# Patient Record
Sex: MALE | Race: WHITE | NOT HISPANIC OR LATINO
[De-identification: names, ages, dates, MRNs, and addresses within clinical notes are randomized per-mention and may not be internally consistent; named-entity substitution may affect disease eponyms.]

---

## 2017-10-24 ENCOUNTER — APPOINTMENT (OUTPATIENT)
Dept: UROLOGY | Facility: CLINIC | Age: 57
End: 2017-10-24
Payer: COMMERCIAL

## 2017-10-24 PROCEDURE — 99214 OFFICE O/P EST MOD 30 MIN: CPT

## 2017-10-25 LAB
APPEARANCE: CLEAR
BACTERIA: NEGATIVE
BILIRUBIN URINE: NEGATIVE
BLOOD URINE: NEGATIVE
COLOR: YELLOW
GLUCOSE QUALITATIVE U: 1000 MG/DL
HYALINE CASTS: 2 /LPF
KETONES URINE: NEGATIVE
LEUKOCYTE ESTERASE URINE: NEGATIVE
MICROSCOPIC-UA: NORMAL
NITRITE URINE: NEGATIVE
PH URINE: 5.5
PROTEIN URINE: NEGATIVE MG/DL
PSA FREE FLD-MCNC: 31
PSA FREE SERPL-MCNC: 0.62 NG/ML
PSA SERPL-MCNC: 2 NG/ML
RED BLOOD CELLS URINE: 6 /HPF
SPECIFIC GRAVITY URINE: 1.04
SQUAMOUS EPITHELIAL CELLS: 0 /HPF
UROBILINOGEN URINE: NEGATIVE MG/DL
WHITE BLOOD CELLS URINE: 1 /HPF

## 2017-10-29 LAB — CORE LAB FLUID CYTOLOGY: NORMAL

## 2018-10-23 ENCOUNTER — APPOINTMENT (OUTPATIENT)
Dept: UROLOGY | Facility: CLINIC | Age: 58
End: 2018-10-23
Payer: COMMERCIAL

## 2018-10-23 PROCEDURE — 99214 OFFICE O/P EST MOD 30 MIN: CPT

## 2018-10-24 LAB
APPEARANCE: CLEAR
BACTERIA: NEGATIVE
BILIRUBIN URINE: NEGATIVE
BLOOD URINE: NEGATIVE
COLOR: YELLOW
GLUCOSE QUALITATIVE U: 1000 MG/DL
KETONES URINE: NEGATIVE
LEUKOCYTE ESTERASE URINE: NEGATIVE
MICROSCOPIC-UA: NORMAL
NITRITE URINE: NEGATIVE
PH URINE: 5
PROTEIN URINE: NEGATIVE MG/DL
PSA FREE FLD-MCNC: 35.1
PSA FREE SERPL-MCNC: 0.61 NG/ML
PSA SERPL-MCNC: 1.74 NG/ML
RED BLOOD CELLS URINE: 1 /HPF
SPECIFIC GRAVITY URINE: 1.04
SQUAMOUS EPITHELIAL CELLS: 0 /HPF
UROBILINOGEN URINE: NEGATIVE MG/DL
WHITE BLOOD CELLS URINE: 0 /HPF

## 2019-10-29 ENCOUNTER — APPOINTMENT (OUTPATIENT)
Dept: UROLOGY | Facility: CLINIC | Age: 59
End: 2019-10-29
Payer: COMMERCIAL

## 2019-10-29 PROCEDURE — 99214 OFFICE O/P EST MOD 30 MIN: CPT

## 2019-10-30 LAB
APPEARANCE: CLEAR
BACTERIA: NEGATIVE
BILIRUBIN URINE: NEGATIVE
BLOOD URINE: NEGATIVE
COLOR: NORMAL
GLUCOSE QUALITATIVE U: ABNORMAL
HYALINE CASTS: 1 /LPF
KETONES URINE: NEGATIVE
LEUKOCYTE ESTERASE URINE: NEGATIVE
MICROSCOPIC-UA: NORMAL
NITRITE URINE: NEGATIVE
PH URINE: 5
PROTEIN URINE: NEGATIVE
PSA FREE FLD-MCNC: 30 %
PSA FREE SERPL-MCNC: 0.66 NG/ML
PSA SERPL-MCNC: 2.17 NG/ML
RED BLOOD CELLS URINE: 1 /HPF
SPECIFIC GRAVITY URINE: 1.03
SQUAMOUS EPITHELIAL CELLS: 0 /HPF
UROBILINOGEN URINE: NORMAL
WHITE BLOOD CELLS URINE: 0 /HPF

## 2020-01-31 ENCOUNTER — APPOINTMENT (OUTPATIENT)
Dept: PULMONOLOGY | Facility: CLINIC | Age: 60
End: 2020-01-31
Payer: COMMERCIAL

## 2020-01-31 VITALS
OXYGEN SATURATION: 96 % | RESPIRATION RATE: 17 BRPM | DIASTOLIC BLOOD PRESSURE: 60 MMHG | HEIGHT: 74 IN | WEIGHT: 238 LBS | HEART RATE: 76 BPM | SYSTOLIC BLOOD PRESSURE: 122 MMHG | BODY MASS INDEX: 30.54 KG/M2

## 2020-01-31 DIAGNOSIS — Z78.9 OTHER SPECIFIED HEALTH STATUS: ICD-10-CM

## 2020-01-31 DIAGNOSIS — Z86.69 PERSONAL HISTORY OF OTHER DISEASES OF THE NERVOUS SYSTEM AND SENSE ORGANS: ICD-10-CM

## 2020-01-31 DIAGNOSIS — Z86.79 PERSONAL HISTORY OF OTHER DISEASES OF THE CIRCULATORY SYSTEM: ICD-10-CM

## 2020-01-31 DIAGNOSIS — Z86.59 PERSONAL HISTORY OF OTHER MENTAL AND BEHAVIORAL DISORDERS: ICD-10-CM

## 2020-01-31 DIAGNOSIS — Z82.0 FAMILY HISTORY OF EPILEPSY AND OTHER DISEASES OF THE NERVOUS SYSTEM: ICD-10-CM

## 2020-01-31 DIAGNOSIS — Z86.39 PERSONAL HISTORY OF OTHER ENDOCRINE, NUTRITIONAL AND METABOLIC DISEASE: ICD-10-CM

## 2020-01-31 DIAGNOSIS — Z82.49 FAMILY HISTORY OF ISCHEMIC HEART DISEASE AND OTHER DISEASES OF THE CIRCULATORY SYSTEM: ICD-10-CM

## 2020-01-31 DIAGNOSIS — Z83.79 FAMILY HISTORY OF OTHER DISEASES OF THE DIGESTIVE SYSTEM: ICD-10-CM

## 2020-01-31 DIAGNOSIS — J45.909 UNSPECIFIED ASTHMA, UNCOMPLICATED: ICD-10-CM

## 2020-01-31 DIAGNOSIS — Z80.8 FAMILY HISTORY OF MALIGNANT NEOPLASM OF OTHER ORGANS OR SYSTEMS: ICD-10-CM

## 2020-01-31 DIAGNOSIS — Z82.5 FAMILY HISTORY OF ASTHMA AND OTHER CHRONIC LOWER RESPIRATORY DISEASES: ICD-10-CM

## 2020-01-31 PROCEDURE — 94060 EVALUATION OF WHEEZING: CPT

## 2020-01-31 PROCEDURE — 94727 GAS DIL/WSHOT DETER LNG VOL: CPT

## 2020-01-31 PROCEDURE — 94618 PULMONARY STRESS TESTING: CPT

## 2020-01-31 PROCEDURE — 99204 OFFICE O/P NEW MOD 45 MIN: CPT | Mod: 25

## 2020-01-31 PROCEDURE — 94729 DIFFUSING CAPACITY: CPT

## 2020-01-31 PROCEDURE — 71046 X-RAY EXAM CHEST 2 VIEWS: CPT

## 2020-01-31 PROCEDURE — ZZZZZ: CPT

## 2020-01-31 RX ORDER — ROSUVASTATIN CALCIUM 5 MG/1
TABLET, FILM COATED ORAL
Refills: 0 | Status: ACTIVE | COMMUNITY

## 2020-01-31 NOTE — HISTORY OF PRESENT ILLNESS
[TextBox_4] : Mr. DÍAZ is a 59 year old male presenting to the office today for initial pulmonary evaluation. His chief complaint is recent illnesses\par -he notes he had been feeling well until getting sick and getting better 3 times\par -his first illness began about 1 month ago\par -he reports he had brought his father in law to the hospital and had been exposed to sick people (flu) for about 5.5 hours\par -he has intermittent headaches (sinus related) and hadnt had them before his sicknesses \par -he reports having SOB upon exertion, but not usually, and denies SOB on his back\par -he reports having a post nasal drip during his illness, but very occasionally due to seasonal. He had  had seasonal allergies and asthma during his childhood\par -he notes his weight is stable\par -he could fall asleep in a car \par -he notes his neck size is 18\par -he reports he wakes up with nocturia 1-2x nightly \par -he reports he moves around and is restless at night. He gets up\par -he notes he sleeps in 1.5-2 hour periods \par -he reports having reflux, and had dysphagia until age 29, and food collects in his throat\par -he denies any lymphadenopathy visual issues, wheezing, chest pain, chest pressure, diarrhea, constipation, dysphagia, dizziness, leg swelling, leg pain, itchy eyes, itchy ears, heartburn, reflux, sour taste in the mouth, myalgias or arthralgias.

## 2020-01-31 NOTE — REASON FOR VISIT
[Initial] : an initial visit [Abnormal CXR/ Chest CT] : an abnormal CXR/ chest CT [TextBox_44] : SOB

## 2020-01-31 NOTE — REVIEW OF SYSTEMS
[Negative] : Neurologic [Nasal Congestion] : nasal congestion [Frequent URIs] : frequent URIs [Sinus Problems] : sinus problems [Postnasal Drip] : postnasal drip [SOB on Exertion] : sob on exertion [GERD] : gerd [Dysphagia] : dysphagia [Dyspnea] : no dyspnea [Wheezing] : no wheezing [Chest Discomfort] : no chest discomfort [Diarrhea] : no diarrhea [Constipation] : no constipation

## 2020-01-31 NOTE — ASSESSMENT
[FreeTextEntry1] : Mr. DÍAZ is a 59 year old male with a history of childhood asthma, allergies, nonsmoker, BPH, depression, HLD, HTN, DM, who comes into the office today for pulmonary evaluation for abnormal CXR (elevated hemidiaphragm), frequent infections, and SOB.\par \par The patient's shortness of breath is multifactorial due to:\par -pulmonary disease \par      -childhood asthma\par      -?diaphragm paralysis\par -poor breathing mechanics \par -overweight/out of shape\par -?cardiac disease \par \par \par Problem 1: Childhood asthma\par -no treatment for asthma at the current time\par \par -Asthma is believed to be caused by inherited (genetic) and environmental factor, but its exact cause is unknown. Asthma may be triggered by allergens, lung infections, or irritants in the air. Asthma triggers are different for each person \par \par Problem 2: ?paralyzed left hemidiaphragm\par -Complete ultrasound of the diaphragm\par \par Problem 3: Allergy / Sinus / Post Nasal Drip\par -Add Olopatadine 0.6% at 1 sniff/nostril BID \par \par Environmental measures for allergies were encouraged including mattress and pillow cover, air purifier, and environmental controls. \par \par Problem 4: GERD\par -Protonix 40 mg before breakfast\par \par -Rule of 2s: avoid eating too much, eating too fast, eating too late, eating too spicy, eating too lousy, eating two hours before bed.\par -Things to avoid including overeating, spicy foods, tight clothing, eating within three hours of bed, this list is not all inclusive. \par -For treatment of reflux, possible options discussed including diet control, H2 blockers, PPIs, as well as coating motility agents discussed as treatment options. Timing of meals and proximity of last meal to sleep were discussed. If symptoms persist, a formal gastrointestinal evaluation is needed.\par \par Problem 5: Frequent Infections / R/o immune deficiency\par -Complete blood work: strep pneumonia titers, IgG levels, quantitative immunoglobulins \par -Due to the fact that this pt has had more infections than would be expected and immunological blood work is indicated this would include: IgG subclasses, quantitative immunoglobulins, Strep pneumoniae titers as well as Vitamin D levels. Based on this blood work we will be able to decide where the pt needs additional pneumococcal vaccine either Prevnar 13 or pneumovax. Immunology evaluation will also be potentially indicated.\par \par Problem 6A: Poor Sleep / ?CHUCK / ?nocturnal hypoxemia\par -Studies to complete: thyroid function test, free and total testosterone level \par -Recommended to complete a home sleep study due to his inability to maintain sleep, nocturia, large neck size, elevated MP class, elevated red blood cells, nonrestorative sleep, EDS\par \par Sleep apnea is associated with adverse clinical consequences which an affect most organ systems. Cardiovascular disease risk includes arrhythmias, atrial fibrillation, hypertension, coronary artery disease, and stroke. Metabolic disorders include diabetes type 2, non-alcoholic fatty liver disease. Mood disorder especially depression; and cognitive decline especially in the elderly. Associations with chronic reflux/Spencer’s esophagus some but not all inclusive. \par -Reasons include arousal consistent with hypopnea; respiratory events most prominent in REM sleep or supine position; therefore sleep staging and body position are important for accurate diagnosis and estimation of AHI. \par \par Problem 6B: RLS\par -Add Mirapex .5 mg QHS\par -Studies to complete: iron studies\par \par Restless Legs Syndrome (RLS), also known as Mckeon-Ekbom Disease, is a common sleep -related movement disorder. About 1 in 10 adults in the [place] have problems from restless leg syndrome. It also can be seen in about 2% of children. Women are twice as likely as men to have RLS. People with RLS will have symptoms most often during times when they are less active, especially at bedtime. RLS most often causes an overwhelming urge to move your legs and sometimes other parts of your body. This urge is associated with unpleasant sensations in different parts of the body. The symptoms can be mild to severe and can affect your ability to go to sleep and stay asleep. People with RLS often sleep less at night and feel more tired during the day. \par \par Problem 7: Poor Mechanics of Breathing\par - Proper breathing techniques were reviewed with an emphasis of exhalation. Patient instructed to breath in for 1 second and out for four seconds. Patient was encouraged to not talk while walking. \par \par Problem 8: Overweight\par -Weight loss, exercise, and diet control were discussed and are highly encouraged. Treatment options were given such as, aqua therapy, and contacting a nutritionist. Recommended to use the elliptical, stationary bike, less use of treadmill. Mindful eating was explained to the patient Obesity is associated with worsening asthma, shortness of breath, and potential for cardiac disease, diabetes, and other underlying medical conditions. \par \par Problem 9: Cardiac\par -recommended to follow up with a cardiologist (Sridhar Morales)\par \par Problem 10: health maintenance\par -s/p influenza vaccine\par -recommended strep pneumonia vaccines after age 65: Prevnar-13 vaccine, followed by Pneumo vaccine 23 one year following\par -recommended early intervention for URIs\par -recommended regular osteoporosis evaluations\par -recommended early dermatological evaluations\par -recommended after the age of 50 to the age of 70, colonoscopy every 5 years \par \par  Follow up in 6-8 weeks\par -he  is recommended to call with any changes, questions, or concerns.

## 2020-01-31 NOTE — ADDENDUM
[FreeTextEntry1] : Documented by Maxim Zamora acting as a scribe for Dr. Brandan Oliveros on 01/31/2020.\par \par All medical record entries made by the Scribe were at my, Dr. Brandan Oliveros's, direction and personally dictated by me on 01/31/2020. I have reviewed the chart and agree that the record accurately reflects my personal performance of the history, physical exam, assessment and plan. I have also personally directed, reviewed, and agree with the discharge instructions.  40

## 2020-01-31 NOTE — PROCEDURE
[FreeTextEntry1] : CXR reveals a normal sized heart; no evidence of infiltrate or effusion, elevated left hemidiaphragm with consequent volume loss\par \par CXR (1/29/2020) reveals elevated left hemidiaphragm. No evidence of active pulmonary disease.\par \par 6 minute walk test reveals a low saturation of 93% with no evidence of dyspnea or fatigue; walked 489 meters\par \par Full PFT revealed normal flows, with a FEV1 of 3.41L, which is 80% of predicted, normal lung volumes, and a diffusion of 29.4, which is 102% of predicted, with a normal flow volume loop \par \par  \par \par

## 2020-01-31 NOTE — PHYSICAL EXAM
[No Acute Distress] : no acute distress [Normal Appearance] : normal appearance [No Neck Mass] : no neck mass [Normal Oropharynx] : normal oropharynx [Normal S1, S2] : normal s1, s2 [Normal Rate/Rhythm] : normal rate/rhythm [No Murmurs] : no murmurs [No Resp Distress] : no resp distress [Clear to Auscultation Bilaterally] : clear to auscultation bilaterally [No Abnormalities] : no abnormalities [Benign] : benign [Normal Gait] : normal gait [No Clubbing] : no clubbing [No Cyanosis] : no cyanosis [FROM] : FROM [Normal Color/ Pigmentation] : normal color/ pigmentation [No Edema] : no edema [No Focal Deficits] : no focal deficits [Oriented x3] : oriented x3 [Normal Affect] : normal affect [II] : Mallampati Class: II [TextBox_68] : I:E ratio 1:3; clear

## 2020-03-06 ENCOUNTER — LABORATORY RESULT (OUTPATIENT)
Age: 60
End: 2020-03-06

## 2020-03-08 LAB
24R-OH-CALCIDIOL SERPL-MCNC: 23.5 PG/ML
25(OH)D3 SERPL-MCNC: 25 NG/ML
BASOPHILS # BLD AUTO: 0.06 K/UL
BASOPHILS NFR BLD AUTO: 0.6 %
DEPRECATED KAPPA LC FREE/LAMBDA SER: 1.34 RATIO
EOSINOPHIL # BLD AUTO: 0.49 K/UL
EOSINOPHIL NFR BLD AUTO: 5 %
FERRITIN SERPL-MCNC: 153 NG/ML
HCT VFR BLD CALC: 55.1 %
HGB BLD-MCNC: 17.4 G/DL
IGA SER QL IEP: 264 MG/DL
IGG SER QL IEP: 896 MG/DL
IGM SER QL IEP: 42 MG/DL
IMM GRANULOCYTES NFR BLD AUTO: 0.3 %
IRON SATN MFR SERPL: 22 %
IRON SERPL-MCNC: 71 UG/DL
KAPPA LC CSF-MCNC: 1.24 MG/DL
KAPPA LC SERPL-MCNC: 1.66 MG/DL
LYMPHOCYTES # BLD AUTO: 2.5 K/UL
LYMPHOCYTES NFR BLD AUTO: 25.4 %
MAN DIFF?: NORMAL
MCHC RBC-ENTMCNC: 30.1 PG
MCHC RBC-ENTMCNC: 31.6 GM/DL
MCV RBC AUTO: 95.2 FL
MONOCYTES # BLD AUTO: 0.96 K/UL
MONOCYTES NFR BLD AUTO: 9.7 %
NEUTROPHILS # BLD AUTO: 5.82 K/UL
NEUTROPHILS NFR BLD AUTO: 59 %
PLATELET # BLD AUTO: 190 K/UL
RBC # BLD: 5.79 M/UL
RBC # FLD: 13.3 %
TIBC SERPL-MCNC: 317 UG/DL
UIBC SERPL-MCNC: 246 UG/DL
WBC # FLD AUTO: 9.86 K/UL

## 2020-03-09 LAB
IGG SUBSET TOTAL IGG: 986 MG/DL
IGG1 SER-MCNC: 541 MG/DL
IGG2 SER-MCNC: 316 MG/DL
IGG3 SER-MCNC: 49 MG/DL
IGG4 SER-MCNC: 30 MG/DL

## 2020-03-10 LAB
A ALTERNATA IGE QN: 0.77 KUA/L
A FUMIGATUS IGE QN: <0.1 KUA/L
C ALBICANS IGE QN: 0.12 KUA/L
C HERBARUM IGE QN: <0.1 KUA/L
CAT DANDER IGE QN: <0.1 KUA/L
COMMON RAGWEED IGE QN: <0.1 KUA/L
D FARINAE IGE QN: <0.1 KUA/L
D PTERONYSS IGE QN: <0.1 KUA/L
DEPRECATED A ALTERNATA IGE RAST QL: 2
DEPRECATED A FUMIGATUS IGE RAST QL: 0
DEPRECATED C ALBICANS IGE RAST QL: NORMAL
DEPRECATED C HERBARUM IGE RAST QL: 0
DEPRECATED CAT DANDER IGE RAST QL: 0
DEPRECATED COMMON RAGWEED IGE RAST QL: 0
DEPRECATED D FARINAE IGE RAST QL: 0
DEPRECATED D PTERONYSS IGE RAST QL: 0
DEPRECATED DOG DANDER IGE RAST QL: 0
DEPRECATED M RACEMOSUS IGE RAST QL: 0
DEPRECATED ROACH IGE RAST QL: 0
DEPRECATED TIMOTHY IGE RAST QL: NORMAL
DEPRECATED WHITE OAK IGE RAST QL: 0
DOG DANDER IGE QN: <0.1 KUA/L
M RACEMOSUS IGE QN: <0.1 KUA/L
ROACH IGE QN: <0.1 KUA/L
TIMOTHY IGE QN: 0.14 KUA/L
TOTAL IGE SMQN RAST: 73 KU/L
WHITE OAK IGE QN: <0.1 KUA/L

## 2020-03-12 LAB
CLAM IGE QN: <0.1 KUA/L
CODFISH IGE QN: <0.1 KUA/L
CORN IGE QN: 0.22 KUA/L
COW MILK IGE QN: <0.1 KUA/L
DEPRECATED CLAM IGE RAST QL: 0
DEPRECATED CODFISH IGE RAST QL: 0
DEPRECATED CORN IGE RAST QL: NORMAL
DEPRECATED COW MILK IGE RAST QL: 0
DEPRECATED EGG WHITE IGE RAST QL: 0
DEPRECATED PEANUT IGE RAST QL: 0
DEPRECATED SCALLOP IGE RAST QL: <0.1 KUA/L
DEPRECATED SESAME SEED IGE RAST QL: 3
DEPRECATED SHRIMP IGE RAST QL: 0
DEPRECATED SOYBEAN IGE RAST QL: NORMAL
DEPRECATED WALNUT IGE RAST QL: 3
DEPRECATED WHEAT IGE RAST QL: NORMAL
EGG WHITE IGE QN: <0.1 KUA/L
PEANUT IGE QN: <0.1 KUA/L
SCALLOP IGE QN: 0
SCALLOP IGE QN: <0.1 KUA/L
SESAME SEED IGE QN: 8.74 KUA/L
SOYBEAN IGE QN: 0.15 KUA/L
TESTOST BND SERPL-MCNC: 9.6 PG/ML
TESTOST SERPL-MCNC: 367.3 NG/DL
WALNUT IGE QN: 8.32 KUA/L
WHEAT IGE QN: 0.32 KUA/L

## 2020-03-13 LAB
DEPRECATED S PNEUM 1 IGG SER-MCNC: 16 MCG/ML
DEPRECATED S PNEUM12 AB SER-ACNC: 5.9 MCG/ML
DEPRECATED S PNEUM14 AB SER-ACNC: 11 MCG/ML
DEPRECATED S PNEUM17 IGG SER IA-MCNC: 26 MCG/ML
DEPRECATED S PNEUM18 IGG SER IA-MCNC: 1.8 MCG/ML
DEPRECATED S PNEUM19 IGG SER-MCNC: 16.2 MCG/ML
DEPRECATED S PNEUM19 IGG SER-MCNC: 17.7 MCG/ML
DEPRECATED S PNEUM2 IGG SER-MCNC: 14 MCG/ML
DEPRECATED S PNEUM20 IGG SER-MCNC: 20.5 MCG/ML
DEPRECATED S PNEUM22 IGG SER-MCNC: 34.5 MCG/ML
DEPRECATED S PNEUM23 AB SER-ACNC: 88.5 MCG/ML
DEPRECATED S PNEUM3 AB SER-ACNC: 27.3 MCG/ML
DEPRECATED S PNEUM34 IGG SER-MCNC: 47.1 MCG/ML
DEPRECATED S PNEUM4 AB SER-ACNC: 5.8 MCG/ML
DEPRECATED S PNEUM5 IGG SER-MCNC: 33.7 MCG/ML
DEPRECATED S PNEUM6 IGG SER-MCNC: 24.9 MCG/ML
DEPRECATED S PNEUM7 IGG SER-ACNC: 43.7 MCG/ML
DEPRECATED S PNEUM8 AB SER-ACNC: 30.5 MCG/ML
DEPRECATED S PNEUM9 AB SER-ACNC: 38.6 MCG/ML
DEPRECATED S PNEUM9 IGG SER-MCNC: 10.1 MCG/ML
STREPTOCOCCUS PNEUMONIAE SEROTYPE 11A: 7.4 MCG/ML
STREPTOCOCCUS PNEUMONIAE SEROTYPE 15B: 11.2 MCG/ML
STREPTOCOCCUS PNEUMONIAE SEROTYPE 33F: 5.8 MCG/ML

## 2020-03-17 DIAGNOSIS — D58.2 OTHER HEMOGLOBINOPATHIES: ICD-10-CM

## 2020-05-08 ENCOUNTER — RX RENEWAL (OUTPATIENT)
Age: 60
End: 2020-05-08

## 2020-11-03 ENCOUNTER — APPOINTMENT (OUTPATIENT)
Dept: UROLOGY | Facility: CLINIC | Age: 60
End: 2020-11-03
Payer: COMMERCIAL

## 2020-11-03 VITALS — TEMPERATURE: 97.1 F

## 2020-11-03 DIAGNOSIS — N52.9 MALE ERECTILE DYSFUNCTION, UNSPECIFIED: ICD-10-CM

## 2020-11-03 PROCEDURE — 99072 ADDL SUPL MATRL&STAF TM PHE: CPT

## 2020-11-03 PROCEDURE — 99214 OFFICE O/P EST MOD 30 MIN: CPT

## 2020-11-04 LAB
APPEARANCE: CLEAR
BACTERIA: NEGATIVE
BILIRUBIN URINE: NEGATIVE
BLOOD URINE: NEGATIVE
COLOR: NORMAL
GLUCOSE QUALITATIVE U: ABNORMAL
HYALINE CASTS: 0 /LPF
KETONES URINE: NEGATIVE
LEUKOCYTE ESTERASE URINE: NEGATIVE
MICROSCOPIC-UA: NORMAL
NITRITE URINE: NEGATIVE
PH URINE: 6
PROTEIN URINE: NORMAL
PSA FREE FLD-MCNC: 29 %
PSA FREE SERPL-MCNC: 0.62 NG/ML
PSA SERPL-MCNC: 2.18 NG/ML
RED BLOOD CELLS URINE: 1 /HPF
SPECIFIC GRAVITY URINE: 1.04
SQUAMOUS EPITHELIAL CELLS: 0 /HPF
UROBILINOGEN URINE: NORMAL
WHITE BLOOD CELLS URINE: 0 /HPF

## 2020-11-10 ENCOUNTER — TRANSCRIPTION ENCOUNTER (OUTPATIENT)
Age: 60
End: 2020-11-10

## 2020-11-17 ENCOUNTER — APPOINTMENT (OUTPATIENT)
Dept: PULMONOLOGY | Facility: CLINIC | Age: 60
End: 2020-11-17
Payer: COMMERCIAL

## 2020-11-17 PROCEDURE — 99214 OFFICE O/P EST MOD 30 MIN: CPT | Mod: 95

## 2020-11-17 RX ORDER — FAMOTIDINE 40 MG/1
40 TABLET, FILM COATED ORAL
Qty: 90 | Refills: 1 | Status: ACTIVE | COMMUNITY
Start: 2020-11-17 | End: 1900-01-01

## 2020-11-17 NOTE — ASSESSMENT
[FreeTextEntry1] : Mr. DAÍZ is a 60 year old male with a history of childhood asthma, allergies, nonsmoker, BPH, depression, HLD, HTN, DM, who presents to the office via video call today for pulmonary evaluation for abnormal CXR (elevated hemidiaphragm), frequent infections, and SOB - now Covid-19 infection with residual bronchospasm\par \par The patient's shortness of breath is multifactorial due to:\par -pulmonary disease \par      -Covid-19 / Bronchospasm\par      -childhood asthma\par      -?diaphragm paralysis\par -poor breathing mechanics \par -overweight/out of shape\par -?cardiac disease \par \par \par Problem 1: Childhood asthma (active)\par -Add Symbicort (160) 2 inhalations BID \par \par -Asthma is believed to be caused by inherited (genetic) and environmental factor, but its exact cause is unknown. Asthma may be triggered by allergens, lung infections, or irritants in the air. Asthma triggers are different for each person \par \par Problem 1A: Covid-19 infection\par -Add a course of Prednisone; 30 mg for 5 days, then 20 mg for 5 days, then 10 mg for 5 days\par Information sheet given to the patient to be reviewed, this medication is never to be used without consulting the prescribing physician. Proper dietary restraint is necessary specifically salt containing foods, if any reaction may occur should be reported. \par \par -s/p Zithromax / Plaquenil (other provider)\par \par -Due to the short supply of COVID19 testing out right now- advised pt that under the advisement of the ID department at [facility]- the recommendation is that all patients with symptoms of suspected coronavirus no longer seek testing and treat symptoms at home while self-quarantined, as long as they are stable.\par Recommendations to patients with possible or confirmed COVID19:\par 1.   Stay home and away from public places. If you must go out, avoid using any kind of public transportation, ridesharing, or taxis.\par 2.   Monitor your symptoms carefully. If your symptoms get worse, call your healthcare provider immediately.\par 3.   Get rest and stay hydrated.\par 4.   Monitor temperature- treat with Tylenol 650 mg Q 6 hours up to 1000 mg TID-QID but not exceed 3500 mg daily for liver precautions. Avoid use of NSAIDs.\par 5.   Be sure to continue to take your maintenance medications for chronic conditions.\par As much as possible, stay in a specific room and away from other people in your home. Also, you should use a separate bathroom, if available. If you need to be around other people in or outside of the home, wear a facemask.\par If you develop emergency warning signs for serious complications for COVID-19 get medical attention immediately. Emergency warning signs include*:\par -Trouble breathing/worsening- unresolved SOB\par -Persistent pain or pressure in the chest\par -New confusion or inability to arouse\par -Bluish lips or face\par  -Worsening fatigue/malaise\par -Inability to drink/stay hydrated\par -High fever unresponsive to Tylenol\par Advised to keep us posted.\par Immune Support Recommendations:\par -OTC Vitamin C 500mg BID \par -OTC Quercetin 250-500mg BID \par -OTC Zinc 75-100mg per day \par -OTC Melatonin 1or 2mg a night \par -OTC Vitamin D 1-4000mg per day \par -OTC Tonic Water 8oz per day \par \par Problem 2: ?paralyzed left hemidiaphragm\par -Complete ultrasound of the diaphragm\par \par Problem 3: Allergy / Sinus / Post Nasal Drip (active)\par -continue Olopatadine 0.6% at 1 sniff/nostril BID \par -Add Allegra D 180 QAM\par -Add Xyzal 5 mg qHS \par \par Environmental measures for allergies were encouraged including mattress and pillow cover, air purifier, and environmental controls. \par \par Problem 4: GERD\par -Protonix 40 mg before breakfast\par \par -Rule of 2s: avoid eating too much, eating too fast, eating too late, eating too spicy, eating too lousy, eating two hours before bed.\par -Things to avoid including overeating, spicy foods, tight clothing, eating within three hours of bed, this list is not all inclusive. \par -For treatment of reflux, possible options discussed including diet control, H2 blockers, PPIs, as well as coating motility agents discussed as treatment options. Timing of meals and proximity of last meal to sleep were discussed. If symptoms persist, a formal gastrointestinal evaluation is needed.\par \par Problem 5: Frequent Infections / R/o immune deficiency\par -Complete blood work: strep pneumonia titers, IgG levels, quantitative immunoglobulins \par -Due to the fact that this pt has had more infections than would be expected and immunological blood work is indicated this would include: IgG subclasses, quantitative immunoglobulins, Strep pneumoniae titers as well as Vitamin D levels. Based on this blood work we will be able to decide where the pt needs additional pneumococcal vaccine either Prevnar 13 or pneumovax. Immunology evaluation will also be potentially indicated.\par \par Problem 6A: Poor Sleep / ?CHUCK / ?nocturnal hypoxemia\par -Studies to complete: thyroid function test, free and total testosterone level \par -Recommended to complete a home sleep study due to his inability to maintain sleep, nocturia, large neck size, elevated MP class, elevated red blood cells, nonrestorative sleep, EDS\par \par Sleep apnea is associated with adverse clinical consequences which an affect most organ systems. Cardiovascular disease risk includes arrhythmias, atrial fibrillation, hypertension, coronary artery disease, and stroke. Metabolic disorders include diabetes type 2, non-alcoholic fatty liver disease. Mood disorder especially depression; and cognitive decline especially in the elderly. Associations with chronic reflux/Spencer’s esophagus some but not all inclusive. \par -Reasons include arousal consistent with hypopnea; respiratory events most prominent in REM sleep or supine position; therefore sleep staging and body position are important for accurate diagnosis and estimation of AHI. \par \par Problem 6B: RLS\par -Add Mirapex .5 mg QHS\par -Studies to complete: iron studies\par \par Restless Legs Syndrome (RLS), also known as Mckeon-Ekbom Disease, is a common sleep -related movement disorder. About 1 in 10 adults in the [place] have problems from restless leg syndrome. It also can be seen in about 2% of children. Women are twice as likely as men to have RLS. People with RLS will have symptoms most often during times when they are less active, especially at bedtime. RLS most often causes an overwhelming urge to move your legs and sometimes other parts of your body. This urge is associated with unpleasant sensations in different parts of the body. The symptoms can be mild to severe and can affect your ability to go to sleep and stay asleep. People with RLS often sleep less at night and feel more tired during the day. \par \par Problem 7: Poor Mechanics of Breathing\par - Proper breathing techniques were reviewed with an emphasis of exhalation. Patient instructed to breath in for 1 second and out for four seconds. Patient was encouraged to not talk while walking. \par \par Problem 8: Overweight\par -Weight loss, exercise, and diet control were discussed and are highly encouraged. Treatment options were given such as, aqua therapy, and contacting a nutritionist. Recommended to use the elliptical, stationary bike, less use of treadmill. Mindful eating was explained to the patient Obesity is associated with worsening asthma, shortness of breath, and potential for cardiac disease, diabetes, and other underlying medical conditions. \par \par Problem 9: Cardiac\par -recommended to follow up with a cardiologist (Sridhar Morales)\par \par Problem 10: health maintenance\par -s/p influenza vaccine\par -recommended strep pneumonia vaccines after age 65: Prevnar-13 vaccine, followed by Pneumo vaccine 23 one year following\par -recommended early intervention for URIs\par -recommended regular osteoporosis evaluations\par -recommended early dermatological evaluations\par -recommended after the age of 50 to the age of 70, colonoscopy every 5 years \par \par  Follow up in 6-8 weeks\par -he  is recommended to call with any changes, questions, or concerns.

## 2020-11-17 NOTE — ADDENDUM
[FreeTextEntry1] : Documented by Maxim Zamora acting as a scribe for Dr. Brandan Oliveros on 11/17/2020.\par \par All medical record entries made by the Scribe were at my, Dr. Brandan Oliveros's, direction and personally dictated by me on 11/17/2020. I have reviewed the chart and agree that the record accurately reflects my personal performance of the history, physical exam, assessment and plan. I have also personally directed, reviewed, and agree with the discharge instructions.

## 2020-11-17 NOTE — REASON FOR VISIT
[Follow-Up] : a follow-up visit [Abnormal CXR/ Chest CT] : an abnormal CXR/ chest CT [TextBox_44] : video call - SOB, Covid-19 infection / bronchospasm

## 2020-11-17 NOTE — HISTORY OF PRESENT ILLNESS
[Home] : at home, [unfilled] , at the time of the visit. [Medical Office: ([facility])___] : at the medical office located in  [Verbal consent obtained from patient] : the patient, [unfilled] [TextBox_4] : Mr. DÍAZ is a 59 year old male with a history of childhood asthma, elevated hemidiaphragm, frequent bronchitis, GERD, overweight, post nasal drip, RLS, snoring, and SOB presenting to the office via video call today for a follow up pulmonary evaluation. His chief complaint is Covid-19 infection\par -his wife states he is positive for Covid, and she tested negative\par -he has been taking Liposomal Glutathione, zince, vitamin D and other supplements\par -he tested positive about 1 week ago, and believes he got sick about 1.5 weeks ago. -His wife states he is not feeling well. He had difficulty one night, and called  a doctor friend, who gave him zithromax 500 mg, Medrol dose pack, hydoxychloroquine. His wellness waxes and wanes\par -his current symptoms include chills, sweating, just started wheezing (belives it is in his upper right lung), throat clearing\par -he states he generally sleeps well, and takes Klonopin\par -he denies any chest pain, chest pressure, diarrhea, constipation, dysphagia, dizziness, sour taste in the mouth, leg swelling, leg pain, itchy eyes, itchy ears, heartburn, reflux, myalgias or arthralgias.

## 2020-11-19 ENCOUNTER — INPATIENT (INPATIENT)
Facility: HOSPITAL | Age: 60
LOS: 4 days | Discharge: HOME | DRG: 177 | End: 2020-11-24
Attending: INTERNAL MEDICINE | Admitting: STUDENT IN AN ORGANIZED HEALTH CARE EDUCATION/TRAINING PROGRAM
Payer: COMMERCIAL

## 2020-11-19 ENCOUNTER — NON-APPOINTMENT (OUTPATIENT)
Age: 60
End: 2020-11-19

## 2020-11-19 VITALS
TEMPERATURE: 100 F | OXYGEN SATURATION: 93 % | SYSTOLIC BLOOD PRESSURE: 122 MMHG | DIASTOLIC BLOOD PRESSURE: 79 MMHG | RESPIRATION RATE: 19 BRPM | HEART RATE: 92 BPM | WEIGHT: 235.01 LBS | HEIGHT: 74 IN

## 2020-11-19 DIAGNOSIS — E11.9 TYPE 2 DIABETES MELLITUS WITHOUT COMPLICATIONS: ICD-10-CM

## 2020-11-19 DIAGNOSIS — E78.5 HYPERLIPIDEMIA, UNSPECIFIED: ICD-10-CM

## 2020-11-19 DIAGNOSIS — U07.1 COVID-19: ICD-10-CM

## 2020-11-19 DIAGNOSIS — Z29.9 ENCOUNTER FOR PROPHYLACTIC MEASURES, UNSPECIFIED: ICD-10-CM

## 2020-11-19 LAB
ALBUMIN SERPL ELPH-MCNC: 3.7 G/DL — SIGNIFICANT CHANGE UP (ref 3.3–5)
ALP SERPL-CCNC: 51 U/L — SIGNIFICANT CHANGE UP (ref 40–120)
ALT FLD-CCNC: 32 U/L — SIGNIFICANT CHANGE UP (ref 10–45)
ANION GAP SERPL CALC-SCNC: 17 MMOL/L — SIGNIFICANT CHANGE UP (ref 5–17)
AST SERPL-CCNC: 18 U/L — SIGNIFICANT CHANGE UP (ref 10–40)
BASOPHILS # BLD AUTO: 0.02 K/UL — SIGNIFICANT CHANGE UP (ref 0–0.2)
BASOPHILS NFR BLD AUTO: 0.2 % — SIGNIFICANT CHANGE UP (ref 0–2)
BILIRUB SERPL-MCNC: 0.4 MG/DL — SIGNIFICANT CHANGE UP (ref 0.2–1.2)
BUN SERPL-MCNC: 21 MG/DL — SIGNIFICANT CHANGE UP (ref 7–23)
CALCIUM SERPL-MCNC: 9 MG/DL — SIGNIFICANT CHANGE UP (ref 8.4–10.5)
CHLORIDE SERPL-SCNC: 99 MMOL/L — SIGNIFICANT CHANGE UP (ref 96–108)
CO2 SERPL-SCNC: 23 MMOL/L — SIGNIFICANT CHANGE UP (ref 22–31)
CREAT SERPL-MCNC: 1.12 MG/DL — SIGNIFICANT CHANGE UP (ref 0.5–1.3)
CRP SERPL-MCNC: 3.95 MG/DL — HIGH (ref 0–0.4)
D DIMER BLD IA.RAPID-MCNC: 210 NG/ML DDU — SIGNIFICANT CHANGE UP
EOSINOPHIL # BLD AUTO: 0.01 K/UL — SIGNIFICANT CHANGE UP (ref 0–0.5)
EOSINOPHIL NFR BLD AUTO: 0.1 % — SIGNIFICANT CHANGE UP (ref 0–6)
GLUCOSE BLDC GLUCOMTR-MCNC: 142 MG/DL — HIGH (ref 70–99)
GLUCOSE BLDC GLUCOMTR-MCNC: 235 MG/DL — HIGH (ref 70–99)
GLUCOSE SERPL-MCNC: 142 MG/DL — HIGH (ref 70–99)
HCT VFR BLD CALC: 46 % — SIGNIFICANT CHANGE UP (ref 39–50)
HGB BLD-MCNC: 15.3 G/DL — SIGNIFICANT CHANGE UP (ref 13–17)
IMM GRANULOCYTES NFR BLD AUTO: 1 % — SIGNIFICANT CHANGE UP (ref 0–1.5)
LYMPHOCYTES # BLD AUTO: 0.95 K/UL — LOW (ref 1–3.3)
LYMPHOCYTES # BLD AUTO: 7.2 % — LOW (ref 13–44)
MCHC RBC-ENTMCNC: 30.4 PG — SIGNIFICANT CHANGE UP (ref 27–34)
MCHC RBC-ENTMCNC: 33.3 GM/DL — SIGNIFICANT CHANGE UP (ref 32–36)
MCV RBC AUTO: 91.3 FL — SIGNIFICANT CHANGE UP (ref 80–100)
MONOCYTES # BLD AUTO: 1.11 K/UL — HIGH (ref 0–0.9)
MONOCYTES NFR BLD AUTO: 8.4 % — SIGNIFICANT CHANGE UP (ref 2–14)
NEUTROPHILS # BLD AUTO: 10.97 K/UL — HIGH (ref 1.8–7.4)
NEUTROPHILS NFR BLD AUTO: 83.1 % — HIGH (ref 43–77)
NRBC # BLD: 0 /100 WBCS — SIGNIFICANT CHANGE UP (ref 0–0)
PLATELET # BLD AUTO: 166 K/UL — SIGNIFICANT CHANGE UP (ref 150–400)
POTASSIUM SERPL-MCNC: 4 MMOL/L — SIGNIFICANT CHANGE UP (ref 3.5–5.3)
POTASSIUM SERPL-SCNC: 4 MMOL/L — SIGNIFICANT CHANGE UP (ref 3.5–5.3)
PROCALCITONIN SERPL-MCNC: 0.13 NG/ML — HIGH (ref 0.02–0.1)
PROT SERPL-MCNC: 6.6 G/DL — SIGNIFICANT CHANGE UP (ref 6–8.3)
RBC # BLD: 5.04 M/UL — SIGNIFICANT CHANGE UP (ref 4.2–5.8)
RBC # FLD: 13.3 % — SIGNIFICANT CHANGE UP (ref 10.3–14.5)
SARS-COV-2 RNA SPEC QL NAA+PROBE: DETECTED
SODIUM SERPL-SCNC: 139 MMOL/L — SIGNIFICANT CHANGE UP (ref 135–145)
WBC # BLD: 13.19 K/UL — HIGH (ref 3.8–10.5)
WBC # FLD AUTO: 13.19 K/UL — HIGH (ref 3.8–10.5)

## 2020-11-19 PROCEDURE — 99285 EMERGENCY DEPT VISIT HI MDM: CPT

## 2020-11-19 PROCEDURE — 99223 1ST HOSP IP/OBS HIGH 75: CPT

## 2020-11-19 PROCEDURE — 71045 X-RAY EXAM CHEST 1 VIEW: CPT | Mod: 26

## 2020-11-19 PROCEDURE — 93010 ELECTROCARDIOGRAM REPORT: CPT | Mod: 59

## 2020-11-19 RX ORDER — DEXTROSE 50 % IN WATER 50 %
15 SYRINGE (ML) INTRAVENOUS ONCE
Refills: 0 | Status: DISCONTINUED | OUTPATIENT
Start: 2020-11-19 | End: 2020-11-24

## 2020-11-19 RX ORDER — ENOXAPARIN SODIUM 100 MG/ML
40 INJECTION SUBCUTANEOUS
Refills: 0 | Status: DISCONTINUED | OUTPATIENT
Start: 2020-11-19 | End: 2020-11-24

## 2020-11-19 RX ORDER — DEXAMETHASONE 0.5 MG/5ML
6 ELIXIR ORAL DAILY
Refills: 0 | Status: DISCONTINUED | OUTPATIENT
Start: 2020-11-19 | End: 2020-11-24

## 2020-11-19 RX ORDER — DEXTROSE 50 % IN WATER 50 %
25 SYRINGE (ML) INTRAVENOUS ONCE
Refills: 0 | Status: DISCONTINUED | OUTPATIENT
Start: 2020-11-19 | End: 2020-11-24

## 2020-11-19 RX ORDER — SERTRALINE 25 MG/1
100 TABLET, FILM COATED ORAL DAILY
Refills: 0 | Status: DISCONTINUED | OUTPATIENT
Start: 2020-11-19 | End: 2020-11-24

## 2020-11-19 RX ORDER — DEXAMETHASONE 0.5 MG/5ML
6 ELIXIR ORAL ONCE
Refills: 0 | Status: COMPLETED | OUTPATIENT
Start: 2020-11-19 | End: 2020-11-19

## 2020-11-19 RX ORDER — REMDESIVIR 5 MG/ML
100 INJECTION INTRAVENOUS EVERY 24 HOURS
Refills: 0 | Status: DISCONTINUED | OUTPATIENT
Start: 2020-11-20 | End: 2020-11-20

## 2020-11-19 RX ORDER — SODIUM CHLORIDE 9 MG/ML
1000 INJECTION, SOLUTION INTRAVENOUS
Refills: 0 | Status: DISCONTINUED | OUTPATIENT
Start: 2020-11-19 | End: 2020-11-24

## 2020-11-19 RX ORDER — SODIUM CHLORIDE 9 MG/ML
500 INJECTION, SOLUTION INTRAVENOUS ONCE
Refills: 0 | Status: COMPLETED | OUTPATIENT
Start: 2020-11-19 | End: 2020-11-19

## 2020-11-19 RX ORDER — ASPIRIN/CALCIUM CARB/MAGNESIUM 324 MG
81 TABLET ORAL DAILY
Refills: 0 | Status: DISCONTINUED | OUTPATIENT
Start: 2020-11-19 | End: 2020-11-24

## 2020-11-19 RX ORDER — INFLUENZA VIRUS VACCINE 15; 15; 15; 15 UG/.5ML; UG/.5ML; UG/.5ML; UG/.5ML
0.5 SUSPENSION INTRAMUSCULAR ONCE
Refills: 0 | Status: DISCONTINUED | OUTPATIENT
Start: 2020-11-19 | End: 2020-11-24

## 2020-11-19 RX ORDER — ALBUTEROL 90 UG/1
2 AEROSOL, METERED ORAL EVERY 6 HOURS
Refills: 0 | Status: DISCONTINUED | OUTPATIENT
Start: 2020-11-19 | End: 2020-11-24

## 2020-11-19 RX ORDER — INSULIN GLARGINE 100 [IU]/ML
10 INJECTION, SOLUTION SUBCUTANEOUS AT BEDTIME
Refills: 0 | Status: DISCONTINUED | OUTPATIENT
Start: 2020-11-19 | End: 2020-11-24

## 2020-11-19 RX ORDER — DEXTROSE 50 % IN WATER 50 %
12.5 SYRINGE (ML) INTRAVENOUS ONCE
Refills: 0 | Status: DISCONTINUED | OUTPATIENT
Start: 2020-11-19 | End: 2020-11-24

## 2020-11-19 RX ORDER — ACETAMINOPHEN 500 MG
650 TABLET ORAL EVERY 4 HOURS
Refills: 0 | Status: DISCONTINUED | OUTPATIENT
Start: 2020-11-19 | End: 2020-11-24

## 2020-11-19 RX ORDER — PANTOPRAZOLE SODIUM 20 MG/1
20 TABLET, DELAYED RELEASE ORAL
Refills: 0 | Status: DISCONTINUED | OUTPATIENT
Start: 2020-11-19 | End: 2020-11-24

## 2020-11-19 RX ORDER — ACETAMINOPHEN 500 MG
975 TABLET ORAL ONCE
Refills: 0 | Status: COMPLETED | OUTPATIENT
Start: 2020-11-19 | End: 2020-11-19

## 2020-11-19 RX ORDER — REMDESIVIR 5 MG/ML
200 INJECTION INTRAVENOUS EVERY 24 HOURS
Refills: 0 | Status: DISCONTINUED | OUTPATIENT
Start: 2020-11-19 | End: 2020-11-20

## 2020-11-19 RX ORDER — ATORVASTATIN CALCIUM 80 MG/1
80 TABLET, FILM COATED ORAL AT BEDTIME
Refills: 0 | Status: DISCONTINUED | OUTPATIENT
Start: 2020-11-19 | End: 2020-11-24

## 2020-11-19 RX ORDER — REMDESIVIR 5 MG/ML
INJECTION INTRAVENOUS
Refills: 0 | Status: DISCONTINUED | OUTPATIENT
Start: 2020-11-19 | End: 2020-11-20

## 2020-11-19 RX ORDER — INSULIN LISPRO 100/ML
VIAL (ML) SUBCUTANEOUS
Refills: 0 | Status: DISCONTINUED | OUTPATIENT
Start: 2020-11-19 | End: 2020-11-24

## 2020-11-19 RX ORDER — GLUCAGON INJECTION, SOLUTION 0.5 MG/.1ML
1 INJECTION, SOLUTION SUBCUTANEOUS ONCE
Refills: 0 | Status: DISCONTINUED | OUTPATIENT
Start: 2020-11-19 | End: 2020-11-24

## 2020-11-19 RX ADMIN — SERTRALINE 100 MILLIGRAM(S): 25 TABLET, FILM COATED ORAL at 22:21

## 2020-11-19 RX ADMIN — ENOXAPARIN SODIUM 40 MILLIGRAM(S): 100 INJECTION SUBCUTANEOUS at 19:43

## 2020-11-19 RX ADMIN — Medication 6 MILLIGRAM(S): at 15:44

## 2020-11-19 RX ADMIN — Medication 975 MILLIGRAM(S): at 15:44

## 2020-11-19 RX ADMIN — ATORVASTATIN CALCIUM 80 MILLIGRAM(S): 80 TABLET, FILM COATED ORAL at 22:21

## 2020-11-19 RX ADMIN — SODIUM CHLORIDE 500 MILLILITER(S): 9 INJECTION, SOLUTION INTRAVENOUS at 15:43

## 2020-11-19 RX ADMIN — INSULIN GLARGINE 10 UNIT(S): 100 INJECTION, SOLUTION SUBCUTANEOUS at 22:10

## 2020-11-19 NOTE — CONSULT NOTE ADULT - ASSESSMENT
60 M never smoker with a PMH of HTN, HLD, Diabetes type 2, depression, asthma, GERD, RLS, now presenting with shortness of breath and low oxygen saturation on home monitor, found to have acute hypoxic resp failure 2/2 COVID19 PNA    1. Acute hypoxic resp failure/ COVID19 PNA:  - currently stable on NC  - Continue to monitor resp status, if worsens suggeest trial of high flow NC  - Agree with Decadron 6 mg IV and Remdesivir IV  - Follow d-dimer, ferritin, CRP   - albuterol HFA Q6H  - Incentive spirometry/ acapella   - Keep O2 sat > 88 %   60 M never smoker with a PMH of HTN, HLD, Diabetes type 2, depression, asthma, GERD, RLS, now presenting with shortness of breath and low oxygen saturation on home monitor, found to have acute hypoxic resp failure 2/2 COVID19 PNA    1. Acute hypoxic resp failure/ COVID19 PNA:  - currently stable on NC  - Continue to monitor resp status, if worsens suggest trial of high flow NC  - Agree with Decadron 6 mg IV and Remdesivir IV  - Follow d-dimer, ferritin, CRP   - albuterol HFA Q6H prn   - Tessalon perle prn for cough  - Incentive spirometry/ acapella   - Keep O2 sat > 88 %    2. Asthma:  - No evidence of asthma exacerbation at present  - Can use albuterol HFA prn   - Add flonase BID and Claritin for allergic rhinitis    3. Possible CHUCK:  - Planned for a home sleep study as an outpt     4. Gen:  - DVt Px:      60 M never smoker with a PMH of HTN, HLD, Diabetes type 2, depression, asthma, GERD, RLS, now presenting with shortness of breath and low oxygen saturation on home monitor, found to have acute hypoxic resp failure 2/2 COVID19 PNA    1. Acute hypoxic resp failure/ COVID19 PNA:  - currently stable on NC  - Continue to monitor resp status, if worsens suggest trial of high flow NC  - Agree with Decadron 6 mg IV and Remdesivir IV  - Follow d-dimer, ferritin, CRP   - albuterol HFA Q6H prn   - Tessalon perle prn for cough  - Incentive spirometry/ acapella   - Keep O2 sat > 88 %    2. Asthma:  - No evidence of asthma exacerbation at present  - Can use albuterol HFA prn   - Add flonase BID and Claritin for allergic rhinitis    3. Possible CHUCK:  - Planned for a home sleep study as an outpt     4. Gen:  - DVT Px: Lovenox BID  - Dr. Oliveros will follow him during hospitalization

## 2020-11-19 NOTE — H&P ADULT - NSHPREVIEWOFSYSTEMS_GEN_ALL_CORE
CONSTITUTIONAL: + weakness, +fevers, +chills; no weight loss or weight gain  EYES: No visual changes; No blurry vision, +chronic double vision  ENT: No vertigo or throat pain   NECK: No pain or stiffness  RESPIRATORY: +cough, no wheezing or hemoptysis; +shortness of breath  CARDIOVASCULAR: No chest pain or palpitations, +CHAUHAN, no orthopnea or PND  GASTROINTESTINAL: No abdominal or epigastric pain. No nausea, vomiting, or hematemesis; +diarrhea, No melena or hematochezia.  GENITOURINARY: No dysuria, frequency or hematuria  NEUROLOGICAL: No numbness or weakness, no syncope  SKIN: No itching, rashes  PSYCH: No insomnia, no depression  IMMUNOLOGY: No gum bleeding, no lymphadenopathy  ENDOCRINE: No polydipsia, no heat or cold intolerance  RHEUM: No joint pain or swelling, no rash, +diffuse arthralgias

## 2020-11-19 NOTE — ED PROVIDER NOTE - CLINICAL SUMMARY MEDICAL DECISION MAKING FREE TEXT BOX
ATTG: : COVID with worsening hypoxia. check labs, check xray, consider medications, coordinate with inpatient care team.

## 2020-11-19 NOTE — H&P ADULT - ASSESSMENT
60M with PMH of chronic bronchitis, NIDDM, HTN, HLD who presents with worsening hypoxia, in setting of known positive COVID.

## 2020-11-19 NOTE — ED PROVIDER NOTE - PROGRESS NOTE DETAILS
ATTG: : spoke with Dr. Oliveros office, pul to follow. attempted to contact Dr. Escobar, awaiting call back.

## 2020-11-19 NOTE — ED PROVIDER NOTE - PHYSICAL EXAMINATION
GEN: Pt in NAD, non-toxic, alert.  PSYCH: Affect appropriate.  EYES: Sclera white w/o injection.   ENT: MM dry. No dysphonia. Neck supple FROM. Trachea midline.   RESP: No evidence of respiratory distress, speaking in full sentences, SpO2 94 on 2.5L, 89 RA. CTAB. No wheezing.  CARDIAC: RRR, clear distinct S1, S2, no appreciable murmurs.  ABD: Abdomen soft, non-tender.  VASC: 2+ radial pulses b/l. No edema or calf tenderness.  SKIN: No rashes on the trunk.

## 2020-11-19 NOTE — ED PROVIDER NOTE - OBJECTIVE STATEMENT
60y M pmhx HTN, HLD, DM, known COVID+ on 11/10, presents to ED c/o body aches and fatigue. Pt referred to ED by pulmonologist Dr. Oliveros for low home O2 sat 89 on RA. Known COVID exposure on 11/6, reports HA, body aches, shortness of breath, chills, fever yesterday 101-responsive to tylenol. Denies chest pain, abdominal pain, nvd.

## 2020-11-19 NOTE — H&P ADULT - PROBLEM SELECTOR PLAN 1
Hypoxic respiratory failure requiring supplemental Oxygen  Mildly elevated inflammatory markers  S/p dexamethasone in ED  Cont dexamethasone 6mg daily   Start remdesivir 200mg X 1, then 100mg for total 5 days  Supportive management with albuterol MDI PRN, tessalon perles  Continuous pulse ox, isolation precautions

## 2020-11-19 NOTE — ED PROVIDER NOTE - ATTENDING CONTRIBUTION TO CARE
61 y/o m with pmhx HTN, HLD, DM, presents for worsening body aches and fatigue. He was sent by his pulmonologist, Dr. Oliveros. Patient was diagnosed with COVID approx 8 days ago after an exposure to a COVID + person. mild fever at home and POx down to 89% on room air. no vomiting no diarrhea. no abd pain no back pain. no focal weakness or numbness. not taking any medications.   Gen.  no acute resp distress  HEENT:  pupils 3 mm reactive to light. dry tongue. EOMI  Lungs:  b/l bs  CVS: S1S2  scant rhonchi at bases. no retraction, O2 sat 89% on room air up to 94% on 2.5 L NC  Abd;  soft non tender no distention  Ext: no pitting edema no calf tenderness  Neuro: aaox3  MSK: strength 5/5 b/l upper and lower ext.

## 2020-11-19 NOTE — H&P ADULT - NSHPPHYSICALEXAM_GEN_ALL_CORE
T(C): 37.8 (11-19-20 @ 14:07), Max: 37.8 (11-19-20 @ 14:07)  T(F): 100.1 (11-19-20 @ 14:07), Max: 100.1 (11-19-20 @ 14:07)  HR: 82 (11-19-20 @ 16:00) (82 - 92)  BP: 133/79 (11-19-20 @ 16:00) (122/79 - 133/79)  RR: 20 (11-19-20 @ 16:00) (19 - 20)  SpO2: 97% (11-19-20 @ 16:00) (93% - 97%)  Wt(kg): --    GENERAL: Well appearing, in NAD  EYES: EOMI, conjunctiva and sclera clear  ENT: moist mucosa, no pharyngeal erythema  NECK: supple, no JVD  LUNG: Clear to auscultation bilaterally; No rales, rhonchi, wheezing, or rubs.   CVS: Regular rate and rhythm; No murmurs, rubs, or gallops  ABDOMEN: Soft, non tender, nondistended. +Bowel sounds.  EXTREMITIES:  no edema, no cyanosis  NEURO:  Alert & Oriented X3,  No focal deficits  PSYCH: Normal affect, normal mood  MUSCULOSKELETAL: FROM, no joint swelling  Skin: warm and dry, normal color

## 2020-11-19 NOTE — H&P ADULT - PROBLEM SELECTOR PLAN 2
F/u A1C  Hold home janumet and invokana while inpatient  Start basal 10U at bedtime; low dose sliding scale; increase as needed given steroid use as above  Diabetic diet

## 2020-11-19 NOTE — H&P ADULT - HISTORY OF PRESENT ILLNESS
60M with PMH of chronic bronchitis, NIDDM, HTN, HLD who presents with worsening hypoxia. Pt tested positive for COVID on 11/10 after having outdoor coffee with a friend, who later tested positive. He reports intermittent fevers, chills, coughing, pleuritic chest pain and diarrhea, and has been taking azithromycin and prednisone at home per his pulmonologist requirements Today, his pulse ox at home wa s85% so was advised to come to ER.  Here he's febrile, tachycardic, tachypneic and hypoxic to 91% n RA. Admitted for further management

## 2020-11-19 NOTE — ED ADULT NURSE NOTE - OBJECTIVE STATEMENT
60YOM pmh HTN, HLD, DM presents to ED c/o fever, chills, cough, HA, covid +. Pt was diagnosed last week tuesday. He was exposed by his coworker. Pt did not take any tynenol. Denies CP, abd pain, N/V/D, burning upon urination. Safety and comfort measures provided. Will continue to monitor.

## 2020-11-20 DIAGNOSIS — F32.9 MAJOR DEPRESSIVE DISORDER, SINGLE EPISODE, UNSPECIFIED: ICD-10-CM

## 2020-11-20 DIAGNOSIS — U07.1 COVID-19: ICD-10-CM

## 2020-11-20 DIAGNOSIS — I10 ESSENTIAL (PRIMARY) HYPERTENSION: ICD-10-CM

## 2020-11-20 LAB
A1C WITH ESTIMATED AVERAGE GLUCOSE RESULT: 7.1 % — HIGH (ref 4–5.6)
ALBUMIN SERPL ELPH-MCNC: 3.8 G/DL — SIGNIFICANT CHANGE UP (ref 3.3–5)
ALP SERPL-CCNC: 52 U/L — SIGNIFICANT CHANGE UP (ref 40–120)
ALT FLD-CCNC: 28 U/L — SIGNIFICANT CHANGE UP (ref 10–45)
ANION GAP SERPL CALC-SCNC: 13 MMOL/L — SIGNIFICANT CHANGE UP (ref 5–17)
APPEARANCE UR: CLEAR — SIGNIFICANT CHANGE UP
AST SERPL-CCNC: 21 U/L — SIGNIFICANT CHANGE UP (ref 10–40)
BILIRUB DIRECT SERPL-MCNC: 0.1 MG/DL — SIGNIFICANT CHANGE UP (ref 0–0.2)
BILIRUB INDIRECT FLD-MCNC: 0.3 MG/DL — SIGNIFICANT CHANGE UP (ref 0.2–1)
BILIRUB SERPL-MCNC: 0.4 MG/DL — SIGNIFICANT CHANGE UP (ref 0.2–1.2)
BILIRUB UR-MCNC: NEGATIVE — SIGNIFICANT CHANGE UP
BUN SERPL-MCNC: 24 MG/DL — HIGH (ref 7–23)
CALCIUM SERPL-MCNC: 9.3 MG/DL — SIGNIFICANT CHANGE UP (ref 8.4–10.5)
CHLORIDE SERPL-SCNC: 102 MMOL/L — SIGNIFICANT CHANGE UP (ref 96–108)
CO2 SERPL-SCNC: 26 MMOL/L — SIGNIFICANT CHANGE UP (ref 22–31)
COLOR SPEC: SIGNIFICANT CHANGE UP
CREAT SERPL-MCNC: 0.95 MG/DL — SIGNIFICANT CHANGE UP (ref 0.5–1.3)
CREAT SERPL-MCNC: 0.95 MG/DL — SIGNIFICANT CHANGE UP (ref 0.5–1.3)
DIFF PNL FLD: NEGATIVE — SIGNIFICANT CHANGE UP
ESTIMATED AVERAGE GLUCOSE: 157 MG/DL — HIGH (ref 68–114)
FERRITIN SERPL-MCNC: 495 NG/ML — HIGH (ref 30–400)
GLUCOSE BLDC GLUCOMTR-MCNC: 153 MG/DL — HIGH (ref 70–99)
GLUCOSE BLDC GLUCOMTR-MCNC: 156 MG/DL — HIGH (ref 70–99)
GLUCOSE BLDC GLUCOMTR-MCNC: 165 MG/DL — HIGH (ref 70–99)
GLUCOSE BLDC GLUCOMTR-MCNC: 227 MG/DL — HIGH (ref 70–99)
GLUCOSE SERPL-MCNC: 135 MG/DL — HIGH (ref 70–99)
GLUCOSE UR QL: ABNORMAL
HCT VFR BLD CALC: 49.3 % — SIGNIFICANT CHANGE UP (ref 39–50)
HCV AB S/CO SERPL IA: 0.1 S/CO — SIGNIFICANT CHANGE UP (ref 0–0.99)
HCV AB SERPL-IMP: SIGNIFICANT CHANGE UP
HGB BLD-MCNC: 15.9 G/DL — SIGNIFICANT CHANGE UP (ref 13–17)
KETONES UR-MCNC: ABNORMAL
LEUKOCYTE ESTERASE UR-ACNC: NEGATIVE — SIGNIFICANT CHANGE UP
MCHC RBC-ENTMCNC: 29.4 PG — SIGNIFICANT CHANGE UP (ref 27–34)
MCHC RBC-ENTMCNC: 32.3 GM/DL — SIGNIFICANT CHANGE UP (ref 32–36)
MCV RBC AUTO: 91.3 FL — SIGNIFICANT CHANGE UP (ref 80–100)
NITRITE UR-MCNC: NEGATIVE — SIGNIFICANT CHANGE UP
NRBC # BLD: 0 /100 WBCS — SIGNIFICANT CHANGE UP (ref 0–0)
PH UR: 6 — SIGNIFICANT CHANGE UP (ref 5–8)
PLATELET # BLD AUTO: 198 K/UL — SIGNIFICANT CHANGE UP (ref 150–400)
POTASSIUM SERPL-MCNC: 4.5 MMOL/L — SIGNIFICANT CHANGE UP (ref 3.5–5.3)
POTASSIUM SERPL-SCNC: 4.5 MMOL/L — SIGNIFICANT CHANGE UP (ref 3.5–5.3)
PROT SERPL-MCNC: 6.8 G/DL — SIGNIFICANT CHANGE UP (ref 6–8.3)
PROT UR-MCNC: SIGNIFICANT CHANGE UP
RBC # BLD: 5.4 M/UL — SIGNIFICANT CHANGE UP (ref 4.2–5.8)
RBC # FLD: 13.2 % — SIGNIFICANT CHANGE UP (ref 10.3–14.5)
SODIUM SERPL-SCNC: 141 MMOL/L — SIGNIFICANT CHANGE UP (ref 135–145)
SP GR SPEC: 1.04 — HIGH (ref 1.01–1.02)
UROBILINOGEN FLD QL: SIGNIFICANT CHANGE UP
WBC # BLD: 13.6 K/UL — HIGH (ref 3.8–10.5)
WBC # FLD AUTO: 13.6 K/UL — HIGH (ref 3.8–10.5)

## 2020-11-20 PROCEDURE — 99232 SBSQ HOSP IP/OBS MODERATE 35: CPT

## 2020-11-20 PROCEDURE — 99233 SBSQ HOSP IP/OBS HIGH 50: CPT

## 2020-11-20 RX ORDER — REMDESIVIR 5 MG/ML
INJECTION INTRAVENOUS
Refills: 0 | Status: COMPLETED | OUTPATIENT
Start: 2020-11-20 | End: 2020-11-24

## 2020-11-20 RX ORDER — FLUTICASONE PROPIONATE 50 MCG
1 SPRAY, SUSPENSION NASAL
Refills: 0 | Status: DISCONTINUED | OUTPATIENT
Start: 2020-11-20 | End: 2020-11-24

## 2020-11-20 RX ORDER — REMDESIVIR 5 MG/ML
100 INJECTION INTRAVENOUS EVERY 24 HOURS
Refills: 0 | Status: COMPLETED | OUTPATIENT
Start: 2020-11-21 | End: 2020-11-24

## 2020-11-20 RX ORDER — REMDESIVIR 5 MG/ML
200 INJECTION INTRAVENOUS EVERY 24 HOURS
Refills: 0 | Status: COMPLETED | OUTPATIENT
Start: 2020-11-20 | End: 2020-11-20

## 2020-11-20 RX ORDER — LORATADINE 10 MG/1
10 TABLET ORAL DAILY
Refills: 0 | Status: DISCONTINUED | OUTPATIENT
Start: 2020-11-20 | End: 2020-11-24

## 2020-11-20 RX ORDER — CARVEDILOL PHOSPHATE 80 MG/1
6.25 CAPSULE, EXTENDED RELEASE ORAL EVERY 12 HOURS
Refills: 0 | Status: DISCONTINUED | OUTPATIENT
Start: 2020-11-20 | End: 2020-11-24

## 2020-11-20 RX ADMIN — SERTRALINE 100 MILLIGRAM(S): 25 TABLET, FILM COATED ORAL at 11:05

## 2020-11-20 RX ADMIN — ALBUTEROL 2 PUFF(S): 90 AEROSOL, METERED ORAL at 05:23

## 2020-11-20 RX ADMIN — Medication 100 MILLIGRAM(S): at 07:04

## 2020-11-20 RX ADMIN — INSULIN GLARGINE 10 UNIT(S): 100 INJECTION, SOLUTION SUBCUTANEOUS at 21:49

## 2020-11-20 RX ADMIN — Medication 1: at 17:04

## 2020-11-20 RX ADMIN — CARVEDILOL PHOSPHATE 6.25 MILLIGRAM(S): 80 CAPSULE, EXTENDED RELEASE ORAL at 21:49

## 2020-11-20 RX ADMIN — CARVEDILOL PHOSPHATE 6.25 MILLIGRAM(S): 80 CAPSULE, EXTENDED RELEASE ORAL at 11:05

## 2020-11-20 RX ADMIN — LORATADINE 10 MILLIGRAM(S): 10 TABLET ORAL at 11:05

## 2020-11-20 RX ADMIN — Medication 1 SPRAY(S): at 21:49

## 2020-11-20 RX ADMIN — Medication 1: at 08:21

## 2020-11-20 RX ADMIN — Medication 6 MILLIGRAM(S): at 05:22

## 2020-11-20 RX ADMIN — Medication 81 MILLIGRAM(S): at 11:05

## 2020-11-20 RX ADMIN — Medication 1 SPRAY(S): at 11:06

## 2020-11-20 RX ADMIN — ENOXAPARIN SODIUM 40 MILLIGRAM(S): 100 INJECTION SUBCUTANEOUS at 17:29

## 2020-11-20 RX ADMIN — Medication 650 MILLIGRAM(S): at 07:07

## 2020-11-20 RX ADMIN — REMDESIVIR 500 MILLIGRAM(S): 5 INJECTION INTRAVENOUS at 11:05

## 2020-11-20 RX ADMIN — Medication 2: at 12:14

## 2020-11-20 RX ADMIN — PANTOPRAZOLE SODIUM 20 MILLIGRAM(S): 20 TABLET, DELAYED RELEASE ORAL at 05:22

## 2020-11-20 RX ADMIN — ATORVASTATIN CALCIUM 80 MILLIGRAM(S): 80 TABLET, FILM COATED ORAL at 21:48

## 2020-11-20 RX ADMIN — ENOXAPARIN SODIUM 40 MILLIGRAM(S): 100 INJECTION SUBCUTANEOUS at 05:22

## 2020-11-20 RX ADMIN — Medication 650 MILLIGRAM(S): at 05:23

## 2020-11-20 NOTE — PROGRESS NOTE ADULT - PROBLEM SELECTOR PLAN 1
#Hypoxic respiratory failure requiring supplemental Oxygen  #History of Asthma, allergic rhinitis, ?CHUCK  Mildly elevated inflammatory markers  S/p dexamethasone in ED  -Appreciate pulmonology team recs    Plan  -Continue dexamethasone 6mg daily   -Continue Remdesivir then 100mg for total 5 days  -Supportive management with albuterol MDI PRN q6h, tylenol prn, Tessalon po  -Start Flonase, claritin   -Continuous pulse ox, air borne isolation precautions  -Incentive spirometery/acapela  -F/U blood cultures  -Monitor CBC, CMP, d dimer, CRP, ferritin

## 2020-11-20 NOTE — CHART NOTE - NSCHARTNOTEFT_GEN_A_CORE
Internal Medicine PA Note    Notified by RN that patient febrile to 101.8. Patient seen and assessed by me. Patient denies chest pain, abdominal pain, dyspnea, headache, chills. Patient admitted due to covid. Tylenol and Ice packs given to patient. Patient w/mild decreased breath sounds on right side. CXR (11/19) noted. D/w [facility], no need for cultures, patient not showing worsening of symptoms. Will continue to monitor for any changes in signs/symptoms or greater level of acuity. Will monitor pulse ox and vitals closely. Endorsed to AM team. To followup w/attending.     Vital Signs Last 24 Hrs  T(C): 38.4 (20 Nov 2020 06:32), Max: 38.8 (20 Nov 2020 05:08)  T(F): 101.1 (20 Nov 2020 06:32), Max: 101.8 (20 Nov 2020 05:08)  HR: 86 (20 Nov 2020 06:32) (70 - 92)  BP: 146/76 (20 Nov 2020 06:32) (122/79 - 161/82)  BP(mean): --  RR: 18 (20 Nov 2020 06:32) (18 - 20)  SpO2: 95% (20 Nov 2020 06:32) (93% - 97%)      < from: Xray Chest 1 View- PORTABLE-Urgent (11.19.20 @ 17:13) >    INTERPRETATION:  A single chest x-ray was obtained on November 19, 2020.    Indication: Cough.    Impression:    The heart is normal in size. Diffuse airspace opacity is seen throughout both lungs more pronounced on the right compatible with pneumonia. No pleural effusion. No pneumothorax.    < end of copied text >      Landon AGUILERA  Dept of Medicine   [id number]

## 2020-11-21 LAB
ALBUMIN SERPL ELPH-MCNC: 3.5 G/DL — SIGNIFICANT CHANGE UP (ref 3.3–5)
ALP SERPL-CCNC: 48 U/L — SIGNIFICANT CHANGE UP (ref 40–120)
ALT FLD-CCNC: 25 U/L — SIGNIFICANT CHANGE UP (ref 10–45)
ANION GAP SERPL CALC-SCNC: 12 MMOL/L — SIGNIFICANT CHANGE UP (ref 5–17)
AST SERPL-CCNC: 24 U/L — SIGNIFICANT CHANGE UP (ref 10–40)
BASOPHILS # BLD AUTO: 0.04 K/UL — SIGNIFICANT CHANGE UP (ref 0–0.2)
BASOPHILS NFR BLD AUTO: 0.3 % — SIGNIFICANT CHANGE UP (ref 0–2)
BILIRUB SERPL-MCNC: 0.4 MG/DL — SIGNIFICANT CHANGE UP (ref 0.2–1.2)
BUN SERPL-MCNC: 24 MG/DL — HIGH (ref 7–23)
CALCIUM SERPL-MCNC: 8.7 MG/DL — SIGNIFICANT CHANGE UP (ref 8.4–10.5)
CHLORIDE SERPL-SCNC: 102 MMOL/L — SIGNIFICANT CHANGE UP (ref 96–108)
CO2 SERPL-SCNC: 23 MMOL/L — SIGNIFICANT CHANGE UP (ref 22–31)
CREAT SERPL-MCNC: 0.88 MG/DL — SIGNIFICANT CHANGE UP (ref 0.5–1.3)
EOSINOPHIL # BLD AUTO: 0.01 K/UL — SIGNIFICANT CHANGE UP (ref 0–0.5)
EOSINOPHIL NFR BLD AUTO: 0.1 % — SIGNIFICANT CHANGE UP (ref 0–6)
GLUCOSE BLDC GLUCOMTR-MCNC: 168 MG/DL — HIGH (ref 70–99)
GLUCOSE BLDC GLUCOMTR-MCNC: 202 MG/DL — HIGH (ref 70–99)
GLUCOSE BLDC GLUCOMTR-MCNC: 212 MG/DL — HIGH (ref 70–99)
GLUCOSE BLDC GLUCOMTR-MCNC: 221 MG/DL — HIGH (ref 70–99)
GLUCOSE SERPL-MCNC: 131 MG/DL — HIGH (ref 70–99)
HCT VFR BLD CALC: 43.8 % — SIGNIFICANT CHANGE UP (ref 39–50)
HGB BLD-MCNC: 15.2 G/DL — SIGNIFICANT CHANGE UP (ref 13–17)
IMM GRANULOCYTES NFR BLD AUTO: 1.2 % — SIGNIFICANT CHANGE UP (ref 0–1.5)
LYMPHOCYTES # BLD AUTO: 1.51 K/UL — SIGNIFICANT CHANGE UP (ref 1–3.3)
LYMPHOCYTES # BLD AUTO: 10.8 % — LOW (ref 13–44)
MCHC RBC-ENTMCNC: 30.5 PG — SIGNIFICANT CHANGE UP (ref 27–34)
MCHC RBC-ENTMCNC: 34.7 GM/DL — SIGNIFICANT CHANGE UP (ref 32–36)
MCV RBC AUTO: 87.8 FL — SIGNIFICANT CHANGE UP (ref 80–100)
MONOCYTES # BLD AUTO: 1.41 K/UL — HIGH (ref 0–0.9)
MONOCYTES NFR BLD AUTO: 10.1 % — SIGNIFICANT CHANGE UP (ref 2–14)
NEUTROPHILS # BLD AUTO: 10.81 K/UL — HIGH (ref 1.8–7.4)
NEUTROPHILS NFR BLD AUTO: 77.5 % — HIGH (ref 43–77)
NRBC # BLD: 0 /100 WBCS — SIGNIFICANT CHANGE UP (ref 0–0)
PLATELET # BLD AUTO: 211 K/UL — SIGNIFICANT CHANGE UP (ref 150–400)
POTASSIUM SERPL-MCNC: 3.6 MMOL/L — SIGNIFICANT CHANGE UP (ref 3.5–5.3)
POTASSIUM SERPL-SCNC: 3.6 MMOL/L — SIGNIFICANT CHANGE UP (ref 3.5–5.3)
PROCALCITONIN SERPL-MCNC: 0.13 NG/ML — HIGH (ref 0.02–0.1)
PROT SERPL-MCNC: 6.3 G/DL — SIGNIFICANT CHANGE UP (ref 6–8.3)
RBC # BLD: 4.99 M/UL — SIGNIFICANT CHANGE UP (ref 4.2–5.8)
RBC # FLD: 12.9 % — SIGNIFICANT CHANGE UP (ref 10.3–14.5)
SODIUM SERPL-SCNC: 137 MMOL/L — SIGNIFICANT CHANGE UP (ref 135–145)
WBC # BLD: 13.95 K/UL — HIGH (ref 3.8–10.5)
WBC # FLD AUTO: 13.95 K/UL — HIGH (ref 3.8–10.5)

## 2020-11-21 PROCEDURE — 99232 SBSQ HOSP IP/OBS MODERATE 35: CPT

## 2020-11-21 PROCEDURE — 99233 SBSQ HOSP IP/OBS HIGH 50: CPT

## 2020-11-21 RX ORDER — ACETAMINOPHEN 500 MG
325 TABLET ORAL ONCE
Refills: 0 | Status: COMPLETED | OUTPATIENT
Start: 2020-11-21 | End: 2020-11-21

## 2020-11-21 RX ADMIN — ENOXAPARIN SODIUM 40 MILLIGRAM(S): 100 INJECTION SUBCUTANEOUS at 06:27

## 2020-11-21 RX ADMIN — Medication 100 MILLIGRAM(S): at 13:01

## 2020-11-21 RX ADMIN — Medication 6 MILLIGRAM(S): at 06:26

## 2020-11-21 RX ADMIN — CARVEDILOL PHOSPHATE 6.25 MILLIGRAM(S): 80 CAPSULE, EXTENDED RELEASE ORAL at 06:26

## 2020-11-21 RX ADMIN — CARVEDILOL PHOSPHATE 6.25 MILLIGRAM(S): 80 CAPSULE, EXTENDED RELEASE ORAL at 17:26

## 2020-11-21 RX ADMIN — Medication 100 MILLIGRAM(S): at 22:09

## 2020-11-21 RX ADMIN — Medication 650 MILLIGRAM(S): at 07:07

## 2020-11-21 RX ADMIN — Medication 2: at 12:15

## 2020-11-21 RX ADMIN — PANTOPRAZOLE SODIUM 20 MILLIGRAM(S): 20 TABLET, DELAYED RELEASE ORAL at 06:28

## 2020-11-21 RX ADMIN — ATORVASTATIN CALCIUM 80 MILLIGRAM(S): 80 TABLET, FILM COATED ORAL at 22:09

## 2020-11-21 RX ADMIN — Medication 81 MILLIGRAM(S): at 11:23

## 2020-11-21 RX ADMIN — REMDESIVIR 500 MILLIGRAM(S): 5 INJECTION INTRAVENOUS at 11:21

## 2020-11-21 RX ADMIN — LORATADINE 10 MILLIGRAM(S): 10 TABLET ORAL at 11:23

## 2020-11-21 RX ADMIN — Medication 650 MILLIGRAM(S): at 06:28

## 2020-11-21 RX ADMIN — Medication 1: at 08:23

## 2020-11-21 RX ADMIN — ENOXAPARIN SODIUM 40 MILLIGRAM(S): 100 INJECTION SUBCUTANEOUS at 17:26

## 2020-11-21 RX ADMIN — Medication 2: at 17:25

## 2020-11-21 RX ADMIN — SERTRALINE 100 MILLIGRAM(S): 25 TABLET, FILM COATED ORAL at 11:23

## 2020-11-21 RX ADMIN — Medication 1 SPRAY(S): at 18:40

## 2020-11-21 RX ADMIN — INSULIN GLARGINE 10 UNIT(S): 100 INJECTION, SOLUTION SUBCUTANEOUS at 22:09

## 2020-11-21 RX ADMIN — Medication 1 SPRAY(S): at 06:27

## 2020-11-21 NOTE — PROGRESS NOTE ADULT - PROBLEM SELECTOR PLAN 1
#Hypoxic respiratory failure requiring supplemental Oxygen  #History of Asthma, allergic rhinitis, ?CHUCK  Mildly elevated inflammatory markers  S/p dexamethasone in ED  -Appreciate pulmonology team recs    Plan  -Continue dexamethasone 6mg daily   -Continue Remdesivir then 100mg for total 5 days  -Supportive management with albuterol MDI PRN q6h, tylenol prn, Tessalon po  -Start Flonase, claritin   -Continuous pulse ox, air borne isolation precautions  -Incentive spirometry /acapella  -F/U blood cultures  -Monitor CBC, CMP, d dimer, CRP, ferritin #Hypoxic respiratory failure requiring supplemental Oxygen  #History of Asthma, allergic rhinitis, ?CHUCK  Mildly elevated inflammatory markers  S/p dexamethasone in ED  -Appreciate pulmonology team recs    Plan  -Continue dexamethasone 6mg daily   -Continue Remdesivir then 100mg for total 5 days  -Supportive management with albuterol MDI PRN q6h, tylenol prn, Tessalon po  -Start Flonase, claritin   -Continuous pulse ox, air borne isolation precautions  -Incentive spirometry /acapella  -F/U blood cultures, recheck procal  -Monitor CBC, CMP, d dimer, CRP, ferritin

## 2020-11-22 LAB
ALBUMIN SERPL ELPH-MCNC: 3.5 G/DL — SIGNIFICANT CHANGE UP (ref 3.3–5)
ALP SERPL-CCNC: 52 U/L — SIGNIFICANT CHANGE UP (ref 40–120)
ALT FLD-CCNC: 29 U/L — SIGNIFICANT CHANGE UP (ref 10–45)
ANION GAP SERPL CALC-SCNC: 11 MMOL/L — SIGNIFICANT CHANGE UP (ref 5–17)
AST SERPL-CCNC: 31 U/L — SIGNIFICANT CHANGE UP (ref 10–40)
BILIRUB DIRECT SERPL-MCNC: <0.1 MG/DL — SIGNIFICANT CHANGE UP (ref 0–0.2)
BILIRUB SERPL-MCNC: 0.4 MG/DL — SIGNIFICANT CHANGE UP (ref 0.2–1.2)
BUN SERPL-MCNC: 22 MG/DL — SIGNIFICANT CHANGE UP (ref 7–23)
CALCIUM SERPL-MCNC: 9.2 MG/DL — SIGNIFICANT CHANGE UP (ref 8.4–10.5)
CHLORIDE SERPL-SCNC: 104 MMOL/L — SIGNIFICANT CHANGE UP (ref 96–108)
CO2 SERPL-SCNC: 28 MMOL/L — SIGNIFICANT CHANGE UP (ref 22–31)
CREAT SERPL-MCNC: 0.86 MG/DL — SIGNIFICANT CHANGE UP (ref 0.5–1.3)
CRP SERPL-MCNC: 2.29 MG/DL — HIGH (ref 0–0.4)
D DIMER BLD IA.RAPID-MCNC: 254 NG/ML DDU — HIGH
FERRITIN SERPL-MCNC: 502 NG/ML — HIGH (ref 30–400)
GLUCOSE BLDC GLUCOMTR-MCNC: 181 MG/DL — HIGH (ref 70–99)
GLUCOSE BLDC GLUCOMTR-MCNC: 202 MG/DL — HIGH (ref 70–99)
GLUCOSE BLDC GLUCOMTR-MCNC: 312 MG/DL — HIGH (ref 70–99)
GLUCOSE BLDC GLUCOMTR-MCNC: 319 MG/DL — HIGH (ref 70–99)
GLUCOSE SERPL-MCNC: 127 MG/DL — HIGH (ref 70–99)
HCT VFR BLD CALC: 48.1 % — SIGNIFICANT CHANGE UP (ref 39–50)
HGB BLD-MCNC: 16 G/DL — SIGNIFICANT CHANGE UP (ref 13–17)
MCHC RBC-ENTMCNC: 30.2 PG — SIGNIFICANT CHANGE UP (ref 27–34)
MCHC RBC-ENTMCNC: 33.3 GM/DL — SIGNIFICANT CHANGE UP (ref 32–36)
MCV RBC AUTO: 90.8 FL — SIGNIFICANT CHANGE UP (ref 80–100)
NRBC # BLD: 0 /100 WBCS — SIGNIFICANT CHANGE UP (ref 0–0)
PLATELET # BLD AUTO: 236 K/UL — SIGNIFICANT CHANGE UP (ref 150–400)
POTASSIUM SERPL-MCNC: 4.5 MMOL/L — SIGNIFICANT CHANGE UP (ref 3.5–5.3)
POTASSIUM SERPL-SCNC: 4.5 MMOL/L — SIGNIFICANT CHANGE UP (ref 3.5–5.3)
PROT SERPL-MCNC: 6.6 G/DL — SIGNIFICANT CHANGE UP (ref 6–8.3)
RBC # BLD: 5.3 M/UL — SIGNIFICANT CHANGE UP (ref 4.2–5.8)
RBC # FLD: 13.2 % — SIGNIFICANT CHANGE UP (ref 10.3–14.5)
SODIUM SERPL-SCNC: 143 MMOL/L — SIGNIFICANT CHANGE UP (ref 135–145)
WBC # BLD: 12.43 K/UL — HIGH (ref 3.8–10.5)
WBC # FLD AUTO: 12.43 K/UL — HIGH (ref 3.8–10.5)

## 2020-11-22 PROCEDURE — 99232 SBSQ HOSP IP/OBS MODERATE 35: CPT

## 2020-11-22 PROCEDURE — 99233 SBSQ HOSP IP/OBS HIGH 50: CPT

## 2020-11-22 RX ORDER — AMLODIPINE BESYLATE 2.5 MG/1
5 TABLET ORAL DAILY
Refills: 0 | Status: DISCONTINUED | OUTPATIENT
Start: 2020-11-22 | End: 2020-11-24

## 2020-11-22 RX ADMIN — CARVEDILOL PHOSPHATE 6.25 MILLIGRAM(S): 80 CAPSULE, EXTENDED RELEASE ORAL at 17:29

## 2020-11-22 RX ADMIN — ENOXAPARIN SODIUM 40 MILLIGRAM(S): 100 INJECTION SUBCUTANEOUS at 17:29

## 2020-11-22 RX ADMIN — CARVEDILOL PHOSPHATE 6.25 MILLIGRAM(S): 80 CAPSULE, EXTENDED RELEASE ORAL at 05:47

## 2020-11-22 RX ADMIN — REMDESIVIR 500 MILLIGRAM(S): 5 INJECTION INTRAVENOUS at 11:17

## 2020-11-22 RX ADMIN — Medication 1: at 08:35

## 2020-11-22 RX ADMIN — ATORVASTATIN CALCIUM 80 MILLIGRAM(S): 80 TABLET, FILM COATED ORAL at 21:14

## 2020-11-22 RX ADMIN — Medication 4: at 12:33

## 2020-11-22 RX ADMIN — Medication 81 MILLIGRAM(S): at 11:17

## 2020-11-22 RX ADMIN — INSULIN GLARGINE 10 UNIT(S): 100 INJECTION, SOLUTION SUBCUTANEOUS at 21:40

## 2020-11-22 RX ADMIN — PANTOPRAZOLE SODIUM 20 MILLIGRAM(S): 20 TABLET, DELAYED RELEASE ORAL at 05:48

## 2020-11-22 RX ADMIN — Medication 100 MILLIGRAM(S): at 05:46

## 2020-11-22 RX ADMIN — AMLODIPINE BESYLATE 5 MILLIGRAM(S): 2.5 TABLET ORAL at 21:14

## 2020-11-22 RX ADMIN — Medication 100 MILLIGRAM(S): at 13:33

## 2020-11-22 RX ADMIN — Medication 6 MILLIGRAM(S): at 05:47

## 2020-11-22 RX ADMIN — Medication 4: at 17:29

## 2020-11-22 RX ADMIN — LORATADINE 10 MILLIGRAM(S): 10 TABLET ORAL at 11:17

## 2020-11-22 RX ADMIN — SERTRALINE 100 MILLIGRAM(S): 25 TABLET, FILM COATED ORAL at 11:17

## 2020-11-22 RX ADMIN — Medication 100 MILLIGRAM(S): at 21:14

## 2020-11-22 RX ADMIN — Medication 1 SPRAY(S): at 17:35

## 2020-11-22 RX ADMIN — Medication 1 SPRAY(S): at 05:47

## 2020-11-22 RX ADMIN — ENOXAPARIN SODIUM 40 MILLIGRAM(S): 100 INJECTION SUBCUTANEOUS at 05:47

## 2020-11-22 NOTE — PROGRESS NOTE ADULT - PROBLEM SELECTOR PLAN 1
#Hypoxic respiratory failure requiring supplemental Oxygen  #History of Asthma, allergic rhinitis, ?CHUCK  Mildly elevated inflammatory markers  S/p dexamethasone in ED  -Appreciate pulmonology team recs    Plan  -Continue dexamethasone 6mg daily   -Continue Remdesivir then 100mg for total 5 days  -Supportive management with albuterol MDI PRN q6h, tylenol prn, Tessalon po  -Start Flonase, claritin   -Continuous pulse ox, air borne isolation precautions  -Incentive spirometry /acapella  -F/U blood cultures  -Monitor CBC, CMP, d dimer, CRP, ferritin

## 2020-11-23 ENCOUNTER — TRANSCRIPTION ENCOUNTER (OUTPATIENT)
Age: 60
End: 2020-11-23

## 2020-11-23 LAB
ALBUMIN SERPL ELPH-MCNC: 3.1 G/DL — LOW (ref 3.3–5)
ALBUMIN SERPL ELPH-MCNC: 3.1 G/DL — LOW (ref 3.3–5)
ALP SERPL-CCNC: 50 U/L — SIGNIFICANT CHANGE UP (ref 40–120)
ALP SERPL-CCNC: 51 U/L — SIGNIFICANT CHANGE UP (ref 40–120)
ALT FLD-CCNC: 36 U/L — SIGNIFICANT CHANGE UP (ref 10–45)
ALT FLD-CCNC: 37 U/L — SIGNIFICANT CHANGE UP (ref 10–45)
ANION GAP SERPL CALC-SCNC: 10 MMOL/L — SIGNIFICANT CHANGE UP (ref 5–17)
AST SERPL-CCNC: 42 U/L — HIGH (ref 10–40)
AST SERPL-CCNC: 42 U/L — HIGH (ref 10–40)
BASOPHILS # BLD AUTO: 0.04 K/UL — SIGNIFICANT CHANGE UP (ref 0–0.2)
BASOPHILS NFR BLD AUTO: 0.3 % — SIGNIFICANT CHANGE UP (ref 0–2)
BILIRUB DIRECT SERPL-MCNC: <0.1 MG/DL — SIGNIFICANT CHANGE UP (ref 0–0.2)
BILIRUB INDIRECT FLD-MCNC: >0.2 MG/DL — SIGNIFICANT CHANGE UP (ref 0.2–1)
BILIRUB SERPL-MCNC: 0.3 MG/DL — SIGNIFICANT CHANGE UP (ref 0.2–1.2)
BILIRUB SERPL-MCNC: 0.3 MG/DL — SIGNIFICANT CHANGE UP (ref 0.2–1.2)
BUN SERPL-MCNC: 20 MG/DL — SIGNIFICANT CHANGE UP (ref 7–23)
CALCIUM SERPL-MCNC: 9 MG/DL — SIGNIFICANT CHANGE UP (ref 8.4–10.5)
CHLORIDE SERPL-SCNC: 105 MMOL/L — SIGNIFICANT CHANGE UP (ref 96–108)
CO2 SERPL-SCNC: 26 MMOL/L — SIGNIFICANT CHANGE UP (ref 22–31)
CREAT SERPL-MCNC: 0.94 MG/DL — SIGNIFICANT CHANGE UP (ref 0.5–1.3)
CREAT SERPL-MCNC: 0.95 MG/DL — SIGNIFICANT CHANGE UP (ref 0.5–1.3)
EOSINOPHIL # BLD AUTO: 0.12 K/UL — SIGNIFICANT CHANGE UP (ref 0–0.5)
EOSINOPHIL NFR BLD AUTO: 0.8 % — SIGNIFICANT CHANGE UP (ref 0–6)
GLUCOSE BLDC GLUCOMTR-MCNC: 175 MG/DL — HIGH (ref 70–99)
GLUCOSE BLDC GLUCOMTR-MCNC: 197 MG/DL — HIGH (ref 70–99)
GLUCOSE BLDC GLUCOMTR-MCNC: 220 MG/DL — HIGH (ref 70–99)
GLUCOSE BLDC GLUCOMTR-MCNC: 246 MG/DL — HIGH (ref 70–99)
GLUCOSE SERPL-MCNC: 145 MG/DL — HIGH (ref 70–99)
HCT VFR BLD CALC: 47 % — SIGNIFICANT CHANGE UP (ref 39–50)
HGB BLD-MCNC: 15.4 G/DL — SIGNIFICANT CHANGE UP (ref 13–17)
IMM GRANULOCYTES NFR BLD AUTO: 2.4 % — HIGH (ref 0–1.5)
LYMPHOCYTES # BLD AUTO: 1.76 K/UL — SIGNIFICANT CHANGE UP (ref 1–3.3)
LYMPHOCYTES # BLD AUTO: 11.6 % — LOW (ref 13–44)
MCHC RBC-ENTMCNC: 29.9 PG — SIGNIFICANT CHANGE UP (ref 27–34)
MCHC RBC-ENTMCNC: 32.8 GM/DL — SIGNIFICANT CHANGE UP (ref 32–36)
MCV RBC AUTO: 91.3 FL — SIGNIFICANT CHANGE UP (ref 80–100)
MONOCYTES # BLD AUTO: 1.39 K/UL — HIGH (ref 0–0.9)
MONOCYTES NFR BLD AUTO: 9.2 % — SIGNIFICANT CHANGE UP (ref 2–14)
NEUTROPHILS # BLD AUTO: 11.47 K/UL — HIGH (ref 1.8–7.4)
NEUTROPHILS NFR BLD AUTO: 75.7 % — SIGNIFICANT CHANGE UP (ref 43–77)
NRBC # BLD: 0 /100 WBCS — SIGNIFICANT CHANGE UP (ref 0–0)
PLATELET # BLD AUTO: 259 K/UL — SIGNIFICANT CHANGE UP (ref 150–400)
POTASSIUM SERPL-MCNC: 4.1 MMOL/L — SIGNIFICANT CHANGE UP (ref 3.5–5.3)
POTASSIUM SERPL-SCNC: 4.1 MMOL/L — SIGNIFICANT CHANGE UP (ref 3.5–5.3)
PROT SERPL-MCNC: 6.2 G/DL — SIGNIFICANT CHANGE UP (ref 6–8.3)
PROT SERPL-MCNC: 6.3 G/DL — SIGNIFICANT CHANGE UP (ref 6–8.3)
RBC # BLD: 5.15 M/UL — SIGNIFICANT CHANGE UP (ref 4.2–5.8)
RBC # FLD: 12.9 % — SIGNIFICANT CHANGE UP (ref 10.3–14.5)
SODIUM SERPL-SCNC: 141 MMOL/L — SIGNIFICANT CHANGE UP (ref 135–145)
WBC # BLD: 15.15 K/UL — HIGH (ref 3.8–10.5)
WBC # FLD AUTO: 15.15 K/UL — HIGH (ref 3.8–10.5)

## 2020-11-23 PROCEDURE — 99233 SBSQ HOSP IP/OBS HIGH 50: CPT

## 2020-11-23 RX ORDER — POTASSIUM CHLORIDE 20 MEQ
40 PACKET (EA) ORAL ONCE
Refills: 0 | Status: COMPLETED | OUTPATIENT
Start: 2020-11-23 | End: 2020-11-23

## 2020-11-23 RX ORDER — ACETAMINOPHEN 500 MG
2 TABLET ORAL
Qty: 0 | Refills: 0 | DISCHARGE
Start: 2020-11-23

## 2020-11-23 RX ORDER — FAMOTIDINE 10 MG/ML
20 INJECTION INTRAVENOUS
Refills: 0 | Status: DISCONTINUED | OUTPATIENT
Start: 2020-11-23 | End: 2020-11-24

## 2020-11-23 RX ORDER — BUDESONIDE AND FORMOTEROL FUMARATE DIHYDRATE 160; 4.5 UG/1; UG/1
2 AEROSOL RESPIRATORY (INHALATION)
Refills: 0 | Status: DISCONTINUED | OUTPATIENT
Start: 2020-11-23 | End: 2020-11-24

## 2020-11-23 RX ORDER — INSULIN LISPRO 100/ML
3 VIAL (ML) SUBCUTANEOUS
Refills: 0 | Status: DISCONTINUED | OUTPATIENT
Start: 2020-11-23 | End: 2020-11-24

## 2020-11-23 RX ADMIN — FAMOTIDINE 20 MILLIGRAM(S): 10 INJECTION INTRAVENOUS at 06:17

## 2020-11-23 RX ADMIN — Medication 1 SPRAY(S): at 17:48

## 2020-11-23 RX ADMIN — REMDESIVIR 500 MILLIGRAM(S): 5 INJECTION INTRAVENOUS at 10:47

## 2020-11-23 RX ADMIN — Medication 2: at 16:48

## 2020-11-23 RX ADMIN — ENOXAPARIN SODIUM 40 MILLIGRAM(S): 100 INJECTION SUBCUTANEOUS at 06:14

## 2020-11-23 RX ADMIN — Medication 3 UNIT(S): at 16:49

## 2020-11-23 RX ADMIN — CARVEDILOL PHOSPHATE 6.25 MILLIGRAM(S): 80 CAPSULE, EXTENDED RELEASE ORAL at 17:48

## 2020-11-23 RX ADMIN — Medication 100 MILLIGRAM(S): at 13:18

## 2020-11-23 RX ADMIN — Medication 100 MILLIGRAM(S): at 22:18

## 2020-11-23 RX ADMIN — ATORVASTATIN CALCIUM 80 MILLIGRAM(S): 80 TABLET, FILM COATED ORAL at 22:18

## 2020-11-23 RX ADMIN — Medication 1: at 08:25

## 2020-11-23 RX ADMIN — ENOXAPARIN SODIUM 40 MILLIGRAM(S): 100 INJECTION SUBCUTANEOUS at 17:48

## 2020-11-23 RX ADMIN — Medication 100 MILLIGRAM(S): at 06:14

## 2020-11-23 RX ADMIN — Medication 40 MILLIEQUIVALENT(S): at 16:48

## 2020-11-23 RX ADMIN — Medication 1 SPRAY(S): at 06:16

## 2020-11-23 RX ADMIN — PANTOPRAZOLE SODIUM 20 MILLIGRAM(S): 20 TABLET, DELAYED RELEASE ORAL at 06:15

## 2020-11-23 RX ADMIN — INSULIN GLARGINE 10 UNIT(S): 100 INJECTION, SOLUTION SUBCUTANEOUS at 21:42

## 2020-11-23 RX ADMIN — Medication 81 MILLIGRAM(S): at 12:16

## 2020-11-23 RX ADMIN — AMLODIPINE BESYLATE 5 MILLIGRAM(S): 2.5 TABLET ORAL at 06:13

## 2020-11-23 RX ADMIN — CARVEDILOL PHOSPHATE 6.25 MILLIGRAM(S): 80 CAPSULE, EXTENDED RELEASE ORAL at 06:14

## 2020-11-23 RX ADMIN — LORATADINE 10 MILLIGRAM(S): 10 TABLET ORAL at 12:17

## 2020-11-23 RX ADMIN — BUDESONIDE AND FORMOTEROL FUMARATE DIHYDRATE 2 PUFF(S): 160; 4.5 AEROSOL RESPIRATORY (INHALATION) at 22:18

## 2020-11-23 RX ADMIN — FAMOTIDINE 20 MILLIGRAM(S): 10 INJECTION INTRAVENOUS at 17:52

## 2020-11-23 RX ADMIN — BUDESONIDE AND FORMOTEROL FUMARATE DIHYDRATE 2 PUFF(S): 160; 4.5 AEROSOL RESPIRATORY (INHALATION) at 12:17

## 2020-11-23 RX ADMIN — Medication 2: at 12:31

## 2020-11-23 RX ADMIN — SERTRALINE 100 MILLIGRAM(S): 25 TABLET, FILM COATED ORAL at 12:17

## 2020-11-23 RX ADMIN — Medication 6 MILLIGRAM(S): at 06:14

## 2020-11-23 NOTE — DISCHARGE NOTE PROVIDER - CARE PROVIDER_API CALL
Brandan Oliveros  INTERNAL MEDICINE  [address], [address]  [place] [address]  Phone: [phone]  Fax: [phone]  Follow Up Time: 1 week

## 2020-11-23 NOTE — DISCHARGE NOTE PROVIDER - NSDCMRMEDTOKEN_GEN_ALL_CORE_FT
acetaminophen 325 mg oral tablet: 2 tab(s) orally every 4 hours, As needed, Temp greater or equal to 38.5C (101.3F)  aspirin 81 mg oral tablet: orally once a day  Crestor 20 mg oral tablet: 1 tab(s) orally once a day  Invokana 100 mg oral tablet: 1 tab(s) orally once a day  Janumet 50 mg-500 mg oral tablet: 1 tab(s) orally 2 times a day  pantoprazole 20 mg oral delayed release tablet: 1 tab(s) orally once a day  Zoloft 100 mg oral tablet: 1 tab(s) orally once a day   acetaminophen 325 mg oral tablet: 2 tab(s) orally every 4 hours, As needed, Temp greater or equal to 38.5C (101.3F)  amLODIPine 5 mg oral tablet: 1 tab(s) orally once a day  aspirin 81 mg oral tablet: orally once a day  benzonatate 100 mg oral capsule: 1 cap(s) orally 3 times a day  budesonide-formoterol 160 mcg-4.5 mcg/inh inhalation aerosol: 2 puff(s) inhaled 2 times a day  carvedilol 6.25 mg oral tablet: 1 tab(s) orally every 12 hours  Crestor 20 mg oral tablet: 1 tab(s) orally once a day  famotidine 20 mg oral tablet: 1 tab(s) orally 2 times a day  fluticasone 50 mcg/inh nasal spray: 1 spray(s) nasal 2 times a day  Invokana 100 mg oral tablet: 1 tab(s) orally once a day  Janumet 50 mg-500 mg oral tablet: 1 tab(s) orally 2 times a day  loratadine 10 mg oral tablet: 1 tab(s) orally once a day  pantoprazole 20 mg oral delayed release tablet: 1 tab(s) orally once a day  Zoloft 100 mg oral tablet: 1 tab(s) orally once a day

## 2020-11-23 NOTE — DISCHARGE NOTE PROVIDER - INSTRUCTIONS
No fluid restrictions. Follow low sodium/salt and low cholesterol/fat diet. Avoid added salt, frozen dinners, canned soups and broths, foods fried in oil, salted/cured meats including ham, storey, and other deli meats high in sodium. Eat plenty of fruits, vegetables and whole grains.

## 2020-11-23 NOTE — DISCHARGE NOTE PROVIDER - NSDCFUSCHEDAPPT_GEN_ALL_CORE_FT
CARLYN DÍAZ ; 01/19/2022 ; [facility] [address] [facility]
normal (ped)...

## 2020-11-23 NOTE — DISCHARGE NOTE PROVIDER - NSDCCPCAREPLAN_GEN_ALL_CORE_FT
PRINCIPAL DISCHARGE DIAGNOSIS  Diagnosis: COVID-19  Assessment and Plan of Treatment: You were found to have novel coronavirus (COVID-19).   - Continue to QUARANTINE yourself and any family members you may live with   -Please follow up with your PCP in 1-2 weeks   -Call your Provider before hand to make then aware of your hospitalization   -Take Tylenol for Fevers every 6 hours as needed- Do not exceed 4gm of Tylenol in a 24 hour period  -Monitor your symptoms. Call your PCP or come to hospital if symptoms worsening   -Stay hydrated   -WEAR A FACE MASK   -Cover your cough and sneezes   -Clean your hands often   -Avoid sharing personal house hold items   -Clean all high touch surfaces- everyday items like table tops , door knobs, cell phones etc   -You should restrict activities outside your home except for getting medical care   -Avoid using public transportation  -Do not go to work, school, or Public areas   -Monitor your oxygen saturation   -Call [facility] of health at [phone]  -Nutrition is important, eat small frequent meals.  -Get lots of rest and drink fluids.  -If your cough worsens, you develop fever greater than 101', you have shaking chills, a fast heartbeat, trouble breathing and/or feel your are breathing much faster than usual, call your healthcare provider.  Make sure you wash your hands frequently.

## 2020-11-23 NOTE — PROGRESS NOTE ADULT - PROBLEM SELECTOR PLAN 1
-Hypoxic respiratory failure requiring supplemental Oxygen  -History of Asthma, allergic rhinitis, ?CHUCK  -Sating 91% 1lnc, wean off O2  -Appreciate pulmonology team recs  -Continue dexamethasone 6mg daily   -Continue Remdesivir 11/19--for total 5 days  -Supportive management with albuterol MDI PRN q6h, tylenol prn, Tessalon po  -Flonase, claritin   -Continuous pulse ox, air borne isolation precautions  -Incentive spirometry /acapella  -Monitor CBC, CMP, d dimer, CRP, ferritin

## 2020-11-23 NOTE — DISCHARGE NOTE PROVIDER - HOSPITAL COURSE
59 y/o M with PMHx of HTN, HLD, DM, known COVID+ on 11/10, presents to ED c/o body aches and fatigue.   Dx: COVID PNA on Remdesivir 11/18 - 11/22 and Decadron    61 y/o M with PMHx of HTN, HLD, DM, known COVID+ on 11/10, presents to ED c/o body aches and fatigue and worsening hypoxia, in setting of known positive COVID.     Recommendation :Hypoxic respiratory failure requiring supplemental Oxygen  -History of Asthma, allergic rhinitis, ?CHUCK  Received dexamethasone 6mg daily and Remdesivir 11/19--for total 5 days  -Supportive management with albuterol MDI PRN q6h, tylenol prn, Tessalon po  -Flonase, Claritin   -Incentive spirometry /acapella.  Medically cleared for discharge .   59 y/o M with PMHx of HTN, HLD, DM, known COVID+ on 11/10, presents to ED c/o body aches and fatigue and worsening hypoxia, in setting of known positive COVID.     Recommendation :Hypoxic respiratory failure requiring supplemental Oxygen  -History of Asthma, allergic rhinitis, ?CHUCK  Received dexamethasone 6mg daily and Remdesivir 11/19--for total 5 days  -Supportive management with albuterol MDI PRN q6h, tylenol prn, Tessalon po  -Flonase, Claritin   -Incentive spirometry /acapella.  Medically cleared for discharge . Patient saturating 91% on RA during three laps in the room.Does not qualify for home oxygen, patient will be send home on pulse oxymetry to monitor oxygen saturation at home.   Medication verified and reviewed with .

## 2020-11-24 ENCOUNTER — TRANSCRIPTION ENCOUNTER (OUTPATIENT)
Age: 60
End: 2020-11-24

## 2020-11-24 VITALS — RESPIRATION RATE: 18 BRPM | OXYGEN SATURATION: 91 %

## 2020-11-24 LAB
ALBUMIN SERPL ELPH-MCNC: 3.5 G/DL — SIGNIFICANT CHANGE UP (ref 3.3–5)
ALP SERPL-CCNC: 59 U/L — SIGNIFICANT CHANGE UP (ref 40–120)
ALT FLD-CCNC: 54 U/L — HIGH (ref 10–45)
ANION GAP SERPL CALC-SCNC: 13 MMOL/L — SIGNIFICANT CHANGE UP (ref 5–17)
AST SERPL-CCNC: 55 U/L — HIGH (ref 10–40)
BILIRUB SERPL-MCNC: 0.5 MG/DL — SIGNIFICANT CHANGE UP (ref 0.2–1.2)
BUN SERPL-MCNC: 19 MG/DL — SIGNIFICANT CHANGE UP (ref 7–23)
CALCIUM SERPL-MCNC: 9.1 MG/DL — SIGNIFICANT CHANGE UP (ref 8.4–10.5)
CHLORIDE SERPL-SCNC: 103 MMOL/L — SIGNIFICANT CHANGE UP (ref 96–108)
CO2 SERPL-SCNC: 23 MMOL/L — SIGNIFICANT CHANGE UP (ref 22–31)
CREAT SERPL-MCNC: 0.82 MG/DL — SIGNIFICANT CHANGE UP (ref 0.5–1.3)
CRP SERPL-MCNC: 2.09 MG/DL — HIGH (ref 0–0.4)
FERRITIN SERPL-MCNC: 501 NG/ML — HIGH (ref 30–400)
GLUCOSE BLDC GLUCOMTR-MCNC: 185 MG/DL — HIGH (ref 70–99)
GLUCOSE BLDC GLUCOMTR-MCNC: 282 MG/DL — HIGH (ref 70–99)
GLUCOSE SERPL-MCNC: 147 MG/DL — HIGH (ref 70–99)
HCT VFR BLD CALC: 46.3 % — SIGNIFICANT CHANGE UP (ref 39–50)
HGB BLD-MCNC: 15.7 G/DL — SIGNIFICANT CHANGE UP (ref 13–17)
LDH SERPL L TO P-CCNC: 378 U/L — HIGH (ref 50–242)
MAGNESIUM SERPL-MCNC: 2.2 MG/DL — SIGNIFICANT CHANGE UP (ref 1.6–2.6)
MCHC RBC-ENTMCNC: 30.1 PG — SIGNIFICANT CHANGE UP (ref 27–34)
MCHC RBC-ENTMCNC: 33.9 GM/DL — SIGNIFICANT CHANGE UP (ref 32–36)
MCV RBC AUTO: 88.7 FL — SIGNIFICANT CHANGE UP (ref 80–100)
NRBC # BLD: 0 /100 WBCS — SIGNIFICANT CHANGE UP (ref 0–0)
PHOSPHATE SERPL-MCNC: 3.6 MG/DL — SIGNIFICANT CHANGE UP (ref 2.5–4.5)
PLATELET # BLD AUTO: 310 K/UL — SIGNIFICANT CHANGE UP (ref 150–400)
POTASSIUM SERPL-MCNC: 4.1 MMOL/L — SIGNIFICANT CHANGE UP (ref 3.5–5.3)
POTASSIUM SERPL-SCNC: 4.1 MMOL/L — SIGNIFICANT CHANGE UP (ref 3.5–5.3)
PROT SERPL-MCNC: 6.5 G/DL — SIGNIFICANT CHANGE UP (ref 6–8.3)
RBC # BLD: 5.22 M/UL — SIGNIFICANT CHANGE UP (ref 4.2–5.8)
RBC # FLD: 12.9 % — SIGNIFICANT CHANGE UP (ref 10.3–14.5)
SODIUM SERPL-SCNC: 139 MMOL/L — SIGNIFICANT CHANGE UP (ref 135–145)
WBC # BLD: 14.95 K/UL — HIGH (ref 3.8–10.5)
WBC # FLD AUTO: 14.95 K/UL — HIGH (ref 3.8–10.5)

## 2020-11-24 PROCEDURE — 80048 BASIC METABOLIC PNL TOTAL CA: CPT

## 2020-11-24 PROCEDURE — 80076 HEPATIC FUNCTION PANEL: CPT

## 2020-11-24 PROCEDURE — 86140 C-REACTIVE PROTEIN: CPT

## 2020-11-24 PROCEDURE — 86803 HEPATITIS C AB TEST: CPT

## 2020-11-24 PROCEDURE — 99239 HOSP IP/OBS DSCHRG MGMT >30: CPT

## 2020-11-24 PROCEDURE — 83735 ASSAY OF MAGNESIUM: CPT

## 2020-11-24 PROCEDURE — 99285 EMERGENCY DEPT VISIT HI MDM: CPT | Mod: 25

## 2020-11-24 PROCEDURE — 82728 ASSAY OF FERRITIN: CPT

## 2020-11-24 PROCEDURE — 94640 AIRWAY INHALATION TREATMENT: CPT

## 2020-11-24 PROCEDURE — 93005 ELECTROCARDIOGRAM TRACING: CPT

## 2020-11-24 PROCEDURE — 82565 ASSAY OF CREATININE: CPT

## 2020-11-24 PROCEDURE — 84100 ASSAY OF PHOSPHORUS: CPT

## 2020-11-24 PROCEDURE — 83036 HEMOGLOBIN GLYCOSYLATED A1C: CPT

## 2020-11-24 PROCEDURE — 87040 BLOOD CULTURE FOR BACTERIA: CPT

## 2020-11-24 PROCEDURE — 80053 COMPREHEN METABOLIC PANEL: CPT

## 2020-11-24 PROCEDURE — 81003 URINALYSIS AUTO W/O SCOPE: CPT

## 2020-11-24 PROCEDURE — U0003: CPT

## 2020-11-24 PROCEDURE — 96374 THER/PROPH/DIAG INJ IV PUSH: CPT

## 2020-11-24 PROCEDURE — 84145 PROCALCITONIN (PCT): CPT

## 2020-11-24 PROCEDURE — 99232 SBSQ HOSP IP/OBS MODERATE 35: CPT

## 2020-11-24 PROCEDURE — 85379 FIBRIN DEGRADATION QUANT: CPT

## 2020-11-24 PROCEDURE — 82248 BILIRUBIN DIRECT: CPT

## 2020-11-24 PROCEDURE — 71045 X-RAY EXAM CHEST 1 VIEW: CPT

## 2020-11-24 PROCEDURE — 85027 COMPLETE CBC AUTOMATED: CPT

## 2020-11-24 PROCEDURE — 83615 LACTATE (LD) (LDH) ENZYME: CPT

## 2020-11-24 PROCEDURE — 82962 GLUCOSE BLOOD TEST: CPT

## 2020-11-24 PROCEDURE — 85025 COMPLETE CBC W/AUTO DIFF WBC: CPT

## 2020-11-24 RX ORDER — BUDESONIDE AND FORMOTEROL FUMARATE DIHYDRATE 160; 4.5 UG/1; UG/1
2 AEROSOL RESPIRATORY (INHALATION)
Qty: 2 | Refills: 0
Start: 2020-11-24 | End: 2020-12-23

## 2020-11-24 RX ORDER — ACETAMINOPHEN 500 MG
650 TABLET ORAL ONCE
Refills: 0 | Status: COMPLETED | OUTPATIENT
Start: 2020-11-24 | End: 2020-11-24

## 2020-11-24 RX ORDER — FLUTICASONE PROPIONATE 50 MCG
1 SPRAY, SUSPENSION NASAL
Qty: 2 | Refills: 0
Start: 2020-11-24 | End: 2020-12-07

## 2020-11-24 RX ORDER — FAMOTIDINE 10 MG/ML
1 INJECTION INTRAVENOUS
Qty: 28 | Refills: 0
Start: 2020-11-24 | End: 2020-12-07

## 2020-11-24 RX ORDER — CARVEDILOL PHOSPHATE 80 MG/1
1 CAPSULE, EXTENDED RELEASE ORAL
Qty: 60 | Refills: 0
Start: 2020-11-24 | End: 2020-12-23

## 2020-11-24 RX ORDER — AMLODIPINE BESYLATE 2.5 MG/1
1 TABLET ORAL
Qty: 30 | Refills: 0
Start: 2020-11-24 | End: 2020-12-23

## 2020-11-24 RX ORDER — LORATADINE 10 MG/1
1 TABLET ORAL
Qty: 7 | Refills: 0
Start: 2020-11-24 | End: 2020-11-30

## 2020-11-24 RX ADMIN — Medication 100 MILLIGRAM(S): at 05:31

## 2020-11-24 RX ADMIN — LORATADINE 10 MILLIGRAM(S): 10 TABLET ORAL at 12:00

## 2020-11-24 RX ADMIN — SERTRALINE 100 MILLIGRAM(S): 25 TABLET, FILM COATED ORAL at 12:01

## 2020-11-24 RX ADMIN — ENOXAPARIN SODIUM 40 MILLIGRAM(S): 100 INJECTION SUBCUTANEOUS at 05:31

## 2020-11-24 RX ADMIN — Medication 81 MILLIGRAM(S): at 12:00

## 2020-11-24 RX ADMIN — Medication 3 UNIT(S): at 12:32

## 2020-11-24 RX ADMIN — AMLODIPINE BESYLATE 5 MILLIGRAM(S): 2.5 TABLET ORAL at 05:31

## 2020-11-24 RX ADMIN — Medication 1 SPRAY(S): at 05:34

## 2020-11-24 RX ADMIN — Medication 6 MILLIGRAM(S): at 05:32

## 2020-11-24 RX ADMIN — Medication 650 MILLIGRAM(S): at 06:00

## 2020-11-24 RX ADMIN — BUDESONIDE AND FORMOTEROL FUMARATE DIHYDRATE 2 PUFF(S): 160; 4.5 AEROSOL RESPIRATORY (INHALATION) at 08:27

## 2020-11-24 RX ADMIN — PANTOPRAZOLE SODIUM 20 MILLIGRAM(S): 20 TABLET, DELAYED RELEASE ORAL at 05:31

## 2020-11-24 RX ADMIN — FAMOTIDINE 20 MILLIGRAM(S): 10 INJECTION INTRAVENOUS at 05:33

## 2020-11-24 RX ADMIN — Medication 3 UNIT(S): at 08:26

## 2020-11-24 RX ADMIN — Medication 1: at 08:26

## 2020-11-24 RX ADMIN — Medication 650 MILLIGRAM(S): at 05:30

## 2020-11-24 RX ADMIN — CARVEDILOL PHOSPHATE 6.25 MILLIGRAM(S): 80 CAPSULE, EXTENDED RELEASE ORAL at 05:31

## 2020-11-24 RX ADMIN — REMDESIVIR 500 MILLIGRAM(S): 5 INJECTION INTRAVENOUS at 11:06

## 2020-11-24 RX ADMIN — Medication 3: at 12:32

## 2020-11-24 NOTE — PROGRESS NOTE ADULT - ASSESSMENT
60 M never smoker with a PMH of HTN, HLD, Diabetes type 2, depression, asthma, GERD, RLS, now presenting with shortness of breath and low oxygen saturation on home monitor, found to have acute hypoxic resp failure 2/2 COVID19 PNA    1. Acute hypoxic resp failure/ COVID19 PNA:  - currently stable on NC  - Continue to monitor resp status, if worsens suggest trial of high flow NC  - Agree with Decadron 6 mg IV and Remdesivir IV  - no evidence of superimposed bacterial pneumonia at present, serum procal with minimal elevation. Agree with observing off antibacterials, FUP Bcxs.   - Follow d-dimer, ferritin, CRP   - albuterol HFA Q6H prn   - Tessalon perle prn for cough  - Incentive spirometry/ acapella   - Keep O2 sat > 88 %    2. Asthma:  - No evidence of asthma exacerbation at present  - Can use albuterol HFA prn   - Add flonase BID and Claritin for allergic rhinitis    3. Possible CHUCK:  - Planned for a home sleep study as an outpt     4. Gen:  - DVT Px: Lovenox BID  - Dr. Oliveros will follow him during hospitalization   
60 M, never smoker, with history of HTN, HLD, DM2, depression, asthma, GERD, and RLS presents with SOB. Admitted for acute hypoxemic respiratory failure secondary to COVID-19 pneumonia.    1. Acute hypoxic respiratory failure, COVID-19 PNA  - Saturating well on 3L NC  - Continue Decadron 6 mg IV and Remdesivir IV  - Monitor off antibiotics  - Follow d-dimer, ferritin, CRP   - Albuterol HFA Q6H prn   - Tessalon perle prn for cough  - Incentive spirometry/ acapella   - Keep O2 sat > 88 %    2. Asthma:  - No evidence of asthma exacerbation at present  - Can use albuterol HFA prn   - Flonase BID and Claritin for allergic rhinitis    3. Possible CHUCK:  - Planned for a home sleep study as an outpt     4. Gen:  - DVT Px: Lovenox BID  - Dr. Oliveros will follow him during hospitalization following weekend
60 M, never smoker, with history of HTN, HLD, DM2, depression, asthma, GERD, and RLS presents with SOB. Admitted for acute hypoxemic respiratory failure secondary to COVID-19 pneumonia.    1. Acute hypoxic respiratory failure, COVID-19 PNA  - Saturating well on 3L NC  - Continue Decadron 6 mg IV and Remdesivir IV  - Monitor off antibiotics  - Follow d-dimer, ferritin, CRP   - Albuterol HFA Q6H prn   - Tessalon perle prn for cough  - Incentive spirometry/ acapella   - Keep O2 sat > 88 %    2. Asthma:  - No evidence of asthma exacerbation at present  - Can use albuterol HFA prn   - Flonase BID and Claritin for allergic rhinitis    3. Possible CHUCK:  - Planned for a home sleep study as an outpt     4. Gen:  - DVT Px: Lovenox BID  - Dr. Oliveros will follow him during hospitalization following weekend  
60 M, never smoker, with history of HTN, HLD, DM2, depression, asthma, GERD, and RLS presents with SOB. Admitted for acute hypoxemic respiratory failure secondary to COVID-19 pneumonia.    1. Acute hypoxic respiratory failure, COVID-19 PNA  - Saturating well on 3L NC  - Continue Decadron 6 mg IV and Remdesivir IV D5  - Monitor off antibiotics    - Follow d-dimer, ferritin, CRP   - Albuterol HFA Q6H prn       - Tessalon perle prn for cough  - Incentive spirometry/ acapella       - Keep O2 sat > 88 %  2. Asthma:  - No evidence of asthma exacerbation at present      use albuterol HFA prn add symbicort 160 2 bid  - Flonase BID and Claritin for allergic rhinitis  3. Possible CHUCK:- Planned for a home sleep study as an outpt   4. Gen:    - DVT Px: Lovenox BID  *****************  11/23-continues to improve-last day of remdisivir   
60 M, never smoker, with history of HTN, HLD, DM2, depression, asthma, GERD, and RLS presents with SOB. Admitted for acute hypoxemic respiratory failure secondary to COVID-19 pneumonia.    1. Acute hypoxic respiratory failure, COVID-19 PNA  - Saturating well on 3L NC  - Continue Decadron 6 mg IV and Remdesivir IV D5  - Monitor off antibiotics    - Follow d-dimer, ferritin, CRP   - Albuterol HFA Q6H prn       - Tessalon perle prn for cough  - Incentive spirometry/ acapella       - Keep O2 sat > 88 %  2. Asthma:  - No evidence of asthma exacerbation at present      use albuterol HFA prn add symbicort 160 2 bid  - Flonase BID and Claritin for allergic rhinitis  3. Possible CHUCK:- Planned for a home sleep study as an outpt   4. Gen:    - DVT Px: Lovenox BID  *****************  11/23-continues to improve-last day of remdisivir   11/24-better over all-? last day of remdisivir--continue dex taper  
60M with PMH of chronic bronchitis, NIDDM, HTN, HLD who presents with worsening hypoxia, in setting of known positive COVID.

## 2020-11-24 NOTE — PROGRESS NOTE ADULT - ATTENDING COMMENTS
As above
as above-improvement continues--? last day of RX  multifactorial resp failure-Acute hypoxic respiratory failure, COVID-19 PNA; asthma, debility, atelectasis--keep sat above 90%  - Covid 19 pneumonitis- Continue Decadron 6 mg IV and Remdesivir IV D5- Tessalon perle prn for cough  Incentive spirometry/ acapella      2. Asthma: No evidence of asthma exacerbation at present      use albuterol HFA prn add symbicort 160 2 bid  3. Allergic rhinitis- Flonase BID and Claritin   4. Possible CHUCK:- Planned for a home sleep study as an outpt   5. Gen:    - DVT Px: Lovenox BID         GI-pepcid 40 per day      DC planning -next 24-48 hrs  Brandan Oliveros MD-Pulmonary   [phone]
as above-improving  multifactorial resp failure-Acute hypoxic respiratory failure, COVID-19 PNA; asthma, debility, atelectasis--keep sat above 90%  - Covid 19 pneumonitis- Continue Decadron 6 mg IV and Remdesivir IV D5- Tessalon perle prn for cough  Incentive spirometry/ acapella      2. Asthma: No evidence of asthma exacerbation at present      use albuterol HFA prn add symbicort 160 2 bid  3. Allergic rhinitis- Flonase BID and Claritin   4. Possible CHUCK:- Planned for a home sleep study as an outpt   5. Gen:    - DVT Px: Lovenox BID         GI-pepcid 40 per day      DC planning -next 24-48 hrs  Brandan Oliveros MD-Pulmonary   [phone]
Case and plan discussed with ACP:
DC time 50 min
Case and plan discussed with ACP: Kathy
Case and plan discussed with ACP: Kyler

## 2020-11-24 NOTE — DISCHARGE NOTE NURSING/CASE MANAGEMENT/SOCIAL WORK - PATIENT PORTAL LINK FT
You can access the FollowMyHealth Patient Portal offered by [facility] by registering at the following website: [email or web address]. By joining [facility]’s FollowMyHealth portal, you will also be able to view your health information using other applications (apps) compatible with our system.

## 2020-11-24 NOTE — PROGRESS NOTE ADULT - PROBLEM SELECTOR PLAN 5
Continue home medication Sertraline

## 2020-11-24 NOTE — PROGRESS NOTE ADULT - PROBLEM SELECTOR PLAN 2
-A1C 7.1  -Hold home janumet and invokana while inpatient  -Continue basal Lantus 10U at bedtime; low dose sliding scale; monitor fingersticks; adjust insulin dose as needed given steroid use as above  -Diabetic diet  -add admelog 3u tid
-A1C 7.1  -Hold home janumet and invokana while inpatient  -Continue basal Lantus 10U at bedtime; low dose sliding scale; monitor fingersticks; adjust insulin dose as needed given steroid use as above  -Diabetic diet  -admelog 3u tid  -dc on home meds
-F/U A1C  -Hold home janumet and invokana while inpatient  -Continue basal Lantus 10U at bedtime; low dose sliding scale; monitor fingersticks; adjust insulin dose as needed given steroid use as above  -Diabetic diet

## 2020-11-24 NOTE — PROGRESS NOTE ADULT - PROBLEM SELECTOR PROBLEM 1
Pneumonia due to COVID-19 virus

## 2020-11-24 NOTE — PROGRESS NOTE ADULT - PROBLEM SELECTOR PLAN 1
-Hypoxic respiratory failure requiring supplemental Oxygen  -History of Asthma, allergic rhinitis, ?CHUCK  -Sating 91% on room air at rest and ambulation   -Appreciate pulmonology team recs  -Continue dexamethasone 6mg daily, can stop on dc   -Continue Remdesivir 11/19--11/24for total 5 days  -Supportive management with albuterol MDI PRN q6h, tylenol prn, Tessalon po  -Flonase, claritin   -Continuous pulse ox, air borne isolation precautions  -Incentive spirometry /acapella  -Monitor CBC, CMP, d dimer, CRP, ferritin

## 2020-11-24 NOTE — PROGRESS NOTE ADULT - PROBLEM SELECTOR PLAN 6
Lovenox BID
Lovenox BID  Dispo- Home
Lovenox BID  Dispo- Home likely tomorrow , will wean off o2 li
Lovenox BID
Lovenox BID

## 2020-11-24 NOTE — PROGRESS NOTE ADULT - SUBJECTIVE AND OBJECTIVE BOX
CHIEF COMPLAINT: f/up resp failure, asthma, covid 19 pneumonitis, osas--continues to  improve-less cough and sob    Interval Events: ? completed remdisivir or last day    REVIEW OF SYSTEMS:  Constitutional: No fevers or chills. No weight loss. No fatigue or generalized malaise.  Eyes: No itching or discharge from the eyes  ENT: No ear pain. No ear discharge. No nasal congestion. No post nasal drip. No epistaxis. No throat pain. No sore throat. No difficulty swallowing.   CV: No chest pain. No palpitations. No lightheadedness or dizziness.   Resp: No dyspnea at rest. + dyspnea on exertion. No orthopnea. No wheezing. No cough. No stridor. No sputum production. No chest pain with respiration.  GI: No nausea. No vomiting. No diarrhea.  MSK: No joint pain or pain in any extremities  Integumentary: No skin lesions. No pedal edema.  Neurological: No gross motor weakness. No sensory changes.  [+ ] All other systems negative  [ ] Unable to assess ROS because ________    OBJECTIVE:  ICU Vital Signs Last 24 Hrs  T(C): 36.7 (24 Nov 2020 05:20), Max: 36.9 (23 Nov 2020 11:44)  T(F): 98 (24 Nov 2020 05:20), Max: 98.5 (23 Nov 2020 11:44)  HR: 72 (24 Nov 2020 05:20) (70 - 72)  BP: 158/84 (24 Nov 2020 05:20) (126/77 - 158/84)  BP(mean): --  ABP: --  ABP(mean): --  RR: 19 (24 Nov 2020 05:20) (18 - 20)  SpO2: 91% (24 Nov 2020 05:20) (86% - 92%)        11-22 @ 07:01  -  11-23 @ 07:00  --------------------------------------------------------  IN: 840 mL / OUT: 0 mL / NET: 840 mL    11-23 @ 07:01  -  11-24 @ 05:59  --------------------------------------------------------  IN: 900 mL / OUT: 0 mL / NET: 900 mL      CAPILLARY BLOOD GLUCOSE      POCT Blood Glucose.: 175 mg/dL (23 Nov 2020 21:35)      PHYSICAL EXAM:  General: Awake, alert, oriented X 3.   HEENT: Atraumatic, normocephalic.                 Mallampatti Grade 3                No nasal congestion.                No tonsillar or pharyngeal exudates.  Lymph Nodes: No palpable lymphadenopathy  Neck: No JVD. No carotid bruit.   Respiratory: Normal chest expansion                         Normal percussion                         Normal and equal air entry                         No wheeze, rhonchi but faint rales.  Cardiovascular: S1 S2 normal. No murmurs, rubs or gallops.   Abdomen: Soft, non-tender, non-distended. No organomegaly. Normoactive bowel sounds.  Extremities: Warm to touch. Peripheral pulse palpable. No pedal edema.   Skin: No rashes or skin lesions  Neurological: Motor and sensory examination equal and normal in all four extremities.  Psychiatry: Appropriate mood and affect.    HOSPITAL MEDICATIONS:  MEDICATIONS  (STANDING):  amLODIPine   Tablet 5 milliGRAM(s) Oral daily  aspirin enteric coated 81 milliGRAM(s) Oral daily  atorvastatin 80 milliGRAM(s) Oral at bedtime  benzonatate 100 milliGRAM(s) Oral three times a day  budesonide 160 MICROgram(s)/formoterol 4.5 MICROgram(s) Inhaler 2 Puff(s) Inhalation two times a day  carvedilol 6.25 milliGRAM(s) Oral every 12 hours  dexAMETHasone  Injectable 6 milliGRAM(s) IV Push daily  dextrose 40% Gel 15 Gram(s) Oral once  dextrose 5%. 1000 milliLiter(s) (50 mL/Hr) IV Continuous <Continuous>  dextrose 5%. 1000 milliLiter(s) (100 mL/Hr) IV Continuous <Continuous>  dextrose 50% Injectable 25 Gram(s) IV Push once  dextrose 50% Injectable 12.5 Gram(s) IV Push once  dextrose 50% Injectable 25 Gram(s) IV Push once  enoxaparin Injectable 40 milliGRAM(s) SubCutaneous two times a day  famotidine    Tablet 20 milliGRAM(s) Oral two times a day  fluticasone propionate 50 MICROgram(s)/spray Nasal Spray 1 Spray(s) Both Nostrils two times a day  glucagon  Injectable 1 milliGRAM(s) IntraMuscular once  influenza   Vaccine 0.5 milliLiter(s) IntraMuscular once  insulin glargine Injectable (LANTUS) 10 Unit(s) SubCutaneous at bedtime  insulin lispro (ADMELOG) corrective regimen sliding scale   SubCutaneous three times a day before meals  insulin lispro Injectable (ADMELOG) 3 Unit(s) SubCutaneous three times a day before meals  loratadine 10 milliGRAM(s) Oral daily  pantoprazole    Tablet 20 milliGRAM(s) Oral before breakfast  remdesivir  IVPB   IV Intermittent   remdesivir  IVPB 100 milliGRAM(s) IV Intermittent every 24 hours  sertraline 100 milliGRAM(s) Oral daily    MEDICATIONS  (PRN):  acetaminophen   Tablet .. 650 milliGRAM(s) Oral every 4 hours PRN Temp greater or equal to 38.5C (101.3F)  ALBUTerol    90 MICROgram(s) HFA Inhaler 2 Puff(s) Inhalation every 6 hours PRN Shortness of Breath and/or Wheezing      LABS:                        15.4   15.15 )-----------( 259      ( 23 Nov 2020 05:53 )             47.0     11-23    141  |  105  |  20  ----------------------------<  145<H>  4.1   |  26  |  0.95    Ca    9.0      23 Nov 2020 05:53    TPro  6.2  /  Alb  3.1<L>  /  TBili  0.3  /  DBili  <0.1  /  AST  42<H>  /  ALT  36  /  AlkPhos  50  11-23              MICROBIOLOGY:     RADIOLOGY:  [ ] Reviewed and interpreted by me    Point of Care Ultrasound Findings:    PFT:    EKG:
CHIEF COMPLAINT: f/up resp failure, asthma, covid 19 pneumonitis, osas--slowly improving-less cough and deeper breath    Interval Events: none    REVIEW OF SYSTEMS:  Constitutional: No fevers or chills. No weight loss. + fatigue or generalized malaise.  Eyes: No itching or discharge from the eyes  ENT: No ear pain. No ear discharge. No nasal congestion. No post nasal drip. No epistaxis. No throat pain. No sore throat. No difficulty swallowing.   CV: No chest pain. No palpitations. No lightheadedness or dizziness.   Resp: No dyspnea at rest. + dyspnea on exertion. No orthopnea. No wheezing. + cough. No stridor. No sputum production. No chest pain with respiration.  GI: No nausea. No vomiting. No diarrhea.  MSK: No joint pain or pain in any extremities  Integumentary: No skin lesions. No pedal edema.  Neurological: No gross motor weakness. No sensory changes.  [+ ] All other systems negative  [ ] Unable to assess ROS because ________    OBJECTIVE:  ICU Vital Signs Last 24 Hrs  T(C): 37.2 (23 Nov 2020 04:35), Max: 37.2 (23 Nov 2020 04:35)  T(F): 98.9 (23 Nov 2020 04:35), Max: 98.9 (23 Nov 2020 04:35)  HR: 87 (23 Nov 2020 04:35) (64 - 87)  BP: 161/92 (23 Nov 2020 04:35) (130/71 - 171/93)  BP(mean): --  ABP: --  ABP(mean): --  RR: 19 (23 Nov 2020 04:35) (18 - 19)  SpO2: 92% (23 Nov 2020 04:35) (88% - 92%)        11-21 @ 07:01  -  11-22 @ 07:00  --------------------------------------------------------  IN: 1260 mL / OUT: 400 mL / NET: 860 mL    11-22 @ 07:01  -  11-23 @ 05:45  --------------------------------------------------------  IN: 720 mL / OUT: 0 mL / NET: 720 mL      CAPILLARY BLOOD GLUCOSE      POCT Blood Glucose.: 202 mg/dL (22 Nov 2020 21:39)      PHYSICAL EXAM: NAD in bed on NC O2  General: Awake, alert, oriented X 3.   HEENT: Atraumatic, normocephalic.                 Mallampatti Grade 3                No nasal congestion.                No tonsillar or pharyngeal exudates.  Lymph Nodes: No palpable lymphadenopathy  Neck: No JVD. No carotid bruit.   Respiratory: Normal chest expansion                         Normal percussion                         Normal and equal air entry                         No wheeze, rhonchi but faint inspiratory rales.  Cardiovascular: S1 S2 normal. No murmurs, rubs or gallops.   Abdomen: Soft, non-tender, non-distended. No organomegaly. Normoactive bowel sounds.  Extremities: Warm to touch. Peripheral pulse palpable. No pedal edema.   Skin: No rashes or skin lesions  Neurological: Motor and sensory examination equal and normal in all four extremities.  Psychiatry: Appropriate mood and affect.    HOSPITAL MEDICATIONS:  MEDICATIONS  (STANDING):  amLODIPine   Tablet 5 milliGRAM(s) Oral daily  aspirin enteric coated 81 milliGRAM(s) Oral daily  atorvastatin 80 milliGRAM(s) Oral at bedtime  benzonatate 100 milliGRAM(s) Oral three times a day  carvedilol 6.25 milliGRAM(s) Oral every 12 hours  dexAMETHasone  Injectable 6 milliGRAM(s) IV Push daily  dextrose 40% Gel 15 Gram(s) Oral once  dextrose 5%. 1000 milliLiter(s) (50 mL/Hr) IV Continuous <Continuous>  dextrose 5%. 1000 milliLiter(s) (100 mL/Hr) IV Continuous <Continuous>  dextrose 50% Injectable 25 Gram(s) IV Push once  dextrose 50% Injectable 12.5 Gram(s) IV Push once  dextrose 50% Injectable 25 Gram(s) IV Push once  enoxaparin Injectable 40 milliGRAM(s) SubCutaneous two times a day  fluticasone propionate 50 MICROgram(s)/spray Nasal Spray 1 Spray(s) Both Nostrils two times a day  glucagon  Injectable 1 milliGRAM(s) IntraMuscular once  influenza   Vaccine 0.5 milliLiter(s) IntraMuscular once  insulin glargine Injectable (LANTUS) 10 Unit(s) SubCutaneous at bedtime  insulin lispro (ADMELOG) corrective regimen sliding scale   SubCutaneous three times a day before meals  loratadine 10 milliGRAM(s) Oral daily  pantoprazole    Tablet 20 milliGRAM(s) Oral before breakfast  remdesivir  IVPB   IV Intermittent   remdesivir  IVPB 100 milliGRAM(s) IV Intermittent every 24 hours  sertraline 100 milliGRAM(s) Oral daily    MEDICATIONS  (PRN):  acetaminophen   Tablet .. 650 milliGRAM(s) Oral every 4 hours PRN Temp greater or equal to 38.5C (101.3F)  ALBUTerol    90 MICROgram(s) HFA Inhaler 2 Puff(s) Inhalation every 6 hours PRN Shortness of Breath and/or Wheezing      LABS:                        16.0   12.43 )-----------( 236      ( 22 Nov 2020 06:59 )             48.1     11-22    143  |  104  |  22  ----------------------------<  127<H>  4.5   |  28  |  0.86    Ca    9.2      22 Nov 2020 07:24    TPro  6.6  /  Alb  3.5  /  TBili  0.4  /  DBili  <0.1  /  AST  31  /  ALT  29  /  AlkPhos  52  11-22              MICROBIOLOGY:     RADIOLOGY:  [ ] Reviewed and interpreted by me    Point of Care Ultrasound Findings:    PFT:    EKG:
[facility] Division of Hospital Medicine  Jatinder Portillo MD  Pager (CHARLETTE-F, 8K-9F): [phone]  Other Times:  [phone]    Patient is a 60y old  Male who presents with a chief complaint of Hypoxia (19 Nov 2020 17:37)      SUBJECTIVE / OVERNIGHT EVENTS:    Tmax 101.8; 86, 146/76, NC 2 L    Patient was examined this morning. He felt febrile/chills this am, has mildly productive cough, some SOB and dizziness - especially when he gets up to walk. Did not have any loose stools today. Headache is improving. Denies vision changes, N, abdominal pain, dysuria, pain/numbness in extremities.     ADDITIONAL REVIEW OF SYSTEMS: neg    MEDICATIONS  (STANDING):  aspirin enteric coated 81 milliGRAM(s) Oral daily  atorvastatin 80 milliGRAM(s) Oral at bedtime  carvedilol 6.25 milliGRAM(s) Oral every 12 hours  dexAMETHasone  Injectable 6 milliGRAM(s) IV Push daily  dextrose 40% Gel 15 Gram(s) Oral once  dextrose 5%. 1000 milliLiter(s) (50 mL/Hr) IV Continuous <Continuous>  dextrose 5%. 1000 milliLiter(s) (100 mL/Hr) IV Continuous <Continuous>  dextrose 50% Injectable 25 Gram(s) IV Push once  dextrose 50% Injectable 12.5 Gram(s) IV Push once  dextrose 50% Injectable 25 Gram(s) IV Push once  enoxaparin Injectable 40 milliGRAM(s) SubCutaneous two times a day  fluticasone propionate 50 MICROgram(s)/spray Nasal Spray 1 Spray(s) Both Nostrils two times a day  glucagon  Injectable 1 milliGRAM(s) IntraMuscular once  influenza   Vaccine 0.5 milliLiter(s) IntraMuscular once  insulin glargine Injectable (LANTUS) 10 Unit(s) SubCutaneous at bedtime  insulin lispro (ADMELOG) corrective regimen sliding scale   SubCutaneous three times a day before meals  loratadine 10 milliGRAM(s) Oral daily  pantoprazole    Tablet 20 milliGRAM(s) Oral before breakfast  remdesivir  IVPB   IV Intermittent   remdesivir  IVPB 200 milliGRAM(s) IV Intermittent every 24 hours  sertraline 100 milliGRAM(s) Oral daily    MEDICATIONS  (PRN):  acetaminophen   Tablet .. 650 milliGRAM(s) Oral every 4 hours PRN Temp greater or equal to 38.5C (101.3F)  ALBUTerol    90 MICROgram(s) HFA Inhaler 2 Puff(s) Inhalation every 6 hours PRN Shortness of Breath and/or Wheezing  benzonatate 100 milliGRAM(s) Oral three times a day PRN Cough      CAPILLARY BLOOD GLUCOSE      POCT Blood Glucose.: 153 mg/dL (20 Nov 2020 08:11)  POCT Blood Glucose.: 235 mg/dL (19 Nov 2020 21:18)  POCT Blood Glucose.: 142 mg/dL (19 Nov 2020 18:51)    I&O's Summary    19 Nov 2020 07:01  -  20 Nov 2020 07:00  --------------------------------------------------------  IN: 1100 mL / OUT: 300 mL / NET: 800 mL        PHYSICAL EXAM:  Vital Signs Last 24 Hrs  T(C): 37.7 (20 Nov 2020 08:05), Max: 38.8 (20 Nov 2020 05:08)  T(F): 99.9 (20 Nov 2020 08:05), Max: 101.8 (20 Nov 2020 05:08)  HR: 86 (20 Nov 2020 06:32) (70 - 92)  BP: 146/76 (20 Nov 2020 06:32) (122/79 - 161/82)  BP(mean): --  RR: 18 (20 Nov 2020 06:32) (18 - 20)  SpO2: 95% (20 Nov 2020 06:32) (93% - 97%)      CONSTITUTIONAL: NAD, well-developed, well-groomed  EYES: PERRL; conjunctiva and sclera clear  ENMT: Moist oral mucosa, no pharyngeal injection or exudates; normal dentition  NECK: Supple, no palpable masses;  RESPIRATORY: Normal respiratory effort; lungs are clear to auscultation bilaterally  CARDIOVASCULAR: Regular rate and rhythm, normal S1 and S2, no murmur/rub/gallop; No lower extremity edema; Peripheral pulses are 2+ bilaterally  ABDOMEN: Nontender to palpation, normoactive bowel sounds, no rebound/guarding;  MUSCULOSKELETAL:  no clubbing or cyanosis of digits; no joint swelling or tenderness to palpation  PSYCH: A+O to person, place, and time; affect appropriate  NEUROLOGY: CN 2-12 are intact and symmetric; no gross sensory deficits   SKIN: No rashes; no palpable lesions      LABS:                        15.9   13.60 )-----------( 198      ( 20 Nov 2020 05:58 )             49.3     11-20    141  |  102  |  24<H>  ----------------------------<  135<H>  4.5   |  26  |  0.95    Ca    9.3      20 Nov 2020 05:58    TPro  6.8  /  Alb  3.8  /  TBili  0.4  /  DBili  0.1  /  AST  21  /  ALT  28  /  AlkPhos  52  11-20                RADIOLOGY & ADDITIONAL TESTS:  Results Reviewed:   Imaging Personally Reviewed:  Electrocardiogram Personally Reviewed:    COORDINATION OF CARE:  Care Discussed with Consultants/Other Providers [Y/N]:  Prior or Outpatient Records Reviewed [Y/N]:  
PULMONARY FOLLOW UP ON BEHALF OF DR. MICAELA ZAYAS    CHIEF COMPLAINT: Fever/ SOB/ Low O2 sat     Interval Events: T max on 101.8 F with severe chills and body aches this am. Ongoing dry cough, denies any worsening SOB, no CP/ wheezing. Endorses mild dizziness when he tries to get up from bed and reports lack of appetite.     REVIEW OF SYSTEMS:  Constitutional: [ ] negative [+ ] fevers [+ ] chills [- ] weight loss [ ] weight gain  HEENT: [ ] negative [ ] dry eyes [- ] eye irritation [- ] postnasal drip [+ ] nasal congestion  CV: [ ] negative  [- ] chest pain [- ] orthopnea [ -] palpitations [ ] murmur  Resp: [ ] negative [+ ] cough [+ ] shortness of breath [ ] dyspnea [- ] wheezing [- ] sputum [ -] hemoptysis  GI: [ ] negative [- ] nausea [- ] vomiting [ -] diarrhea [- ] constipation [- ] abd pain [ ] dysphagia   : [ ] negative [- ] dysuria [- ] nocturia [- ] hematuria [ ] increased urinary frequency  Musculoskeletal: [ ] negative [ -] back pain [ ] myalgias [ ] arthralgias [ ] fracture  Skin: [ ] negative [ ] rash [- ] itch  Neurological: [ ] negative [ -] headache [ -] dizziness [- ] syncope [ ] weakness [ ] numbness  Psychiatric: [ ] negative [ ] anxiety [ -] depression  Endocrine: [ ] negative [+ ] diabetes [- ] thyroid problem  Hematologic/Lymphatic: [ ] negative [- ] anemia [- ] bleeding problem  Allergic/Immunologic: [ ] negative [ ] itchy eyes [- ] nasal discharge [ ] hives [ ] angioedema  [x ] All other systems negative  [ ] Unable to assess ROS because ________    OBJECTIVE:  ICU Vital Signs Last 24 Hrs  T(C): 36.6 (20 Nov 2020 11:16), Max: 38.8 (20 Nov 2020 05:08)  T(F): 97.9 (20 Nov 2020 11:16), Max: 101.8 (20 Nov 2020 05:08)  HR: 70 (20 Nov 2020 11:16) (70 - 92)  BP: 126/71 (20 Nov 2020 11:16) (122/79 - 161/82)  BP(mean): --  ABP: --  ABP(mean): --  RR: 19 (20 Nov 2020 11:16) (18 - 20)  SpO2: 92% (20 Nov 2020 11:16) (89% - 97%)        11-19 @ 07:01  -  11-20 @ 07:00  --------------------------------------------------------  IN: 1100 mL / OUT: 300 mL / NET: 800 mL      CAPILLARY BLOOD GLUCOSE      POCT Blood Glucose.: 153 mg/dL (20 Nov 2020 08:11)      PHYSICAL EXAM:  General: NAD, speaking in full sentences, no accessory muscle use   HEENT: PERRLA  Lymph Nodes: No palpable cervical LNs  Neck: Supple  Respiratory: slighlty coarse BS b/l bases , no wheezing or crackles   Cardiovascular: RRR, S1+S2+0  Abdomen: Soft, NT, ND, BS+  Extremities: No edema, pulses + b/l LEs  Skin: No rash   Neurological: AAOx3, grossly non focal   Psychiatry: Normal mood     HOSPITAL MEDICATIONS:  aspirin enteric coated 81 milliGRAM(s) Oral daily  enoxaparin Injectable 40 milliGRAM(s) SubCutaneous two times a day    remdesivir  IVPB   IV Intermittent     carvedilol 6.25 milliGRAM(s) Oral every 12 hours    atorvastatin 80 milliGRAM(s) Oral at bedtime  dexAMETHasone  Injectable 6 milliGRAM(s) IV Push daily  dextrose 40% Gel 15 Gram(s) Oral once  dextrose 50% Injectable 25 Gram(s) IV Push once  dextrose 50% Injectable 12.5 Gram(s) IV Push once  dextrose 50% Injectable 25 Gram(s) IV Push once  glucagon  Injectable 1 milliGRAM(s) IntraMuscular once  insulin glargine Injectable (LANTUS) 10 Unit(s) SubCutaneous at bedtime  insulin lispro (ADMELOG) corrective regimen sliding scale   SubCutaneous three times a day before meals    ALBUTerol    90 MICROgram(s) HFA Inhaler 2 Puff(s) Inhalation every 6 hours PRN  benzonatate 100 milliGRAM(s) Oral three times a day PRN  loratadine 10 milliGRAM(s) Oral daily    acetaminophen   Tablet .. 650 milliGRAM(s) Oral every 4 hours PRN  sertraline 100 milliGRAM(s) Oral daily    pantoprazole    Tablet 20 milliGRAM(s) Oral before breakfast        dextrose 5%. 1000 milliLiter(s) IV Continuous <Continuous>  dextrose 5%. 1000 milliLiter(s) IV Continuous <Continuous>    influenza   Vaccine 0.5 milliLiter(s) IntraMuscular once    fluticasone propionate 50 MICROgram(s)/spray Nasal Spray 1 Spray(s) Both Nostrils two times a day        LABS:                        15.9   13.60 )-----------( 198      ( 20 Nov 2020 05:58 )             49.3     Hgb Trend: 15.9<--, 15.3<--  11-20    141  |  102  |  24<H>  ----------------------------<  135<H>  4.5   |  26  |  0.95    Ca    9.3      20 Nov 2020 05:58    TPro  6.8  /  Alb  3.8  /  TBili  0.4  /  DBili  0.1  /  AST  21  /  ALT  28  /  AlkPhos  52  11-20    Creatinine Trend: 0.95<--, 1.12<--            MICROBIOLOGY:   micrCOVID-19 PCR: Detected    RADIOLOGY:  < from: Xray Chest 1 View- PORTABLE-Urgent (11.19.20 @ 17:13) >  The heart is normal in size. Diffuse airspace opacity is seen throughout both lungs more pronounced on the right compatible with pneumonia. No pleural effusion. No pneumothorax.    [ x] Reviewed and interpreted by me    PULMONARY FUNCTION TESTS:    EKG:
Patient is a 60y old  Male who presents with a chief complaint of Hypoxia (23 Nov 2020 05:45)      SUBJECTIVE / OVERNIGHT EVENTS: feels much better, breathing and cough is better, no cp, no dizziness when ambulating     MEDICATIONS  (STANDING):  amLODIPine   Tablet 5 milliGRAM(s) Oral daily  aspirin enteric coated 81 milliGRAM(s) Oral daily  atorvastatin 80 milliGRAM(s) Oral at bedtime  benzonatate 100 milliGRAM(s) Oral three times a day  budesonide 160 MICROgram(s)/formoterol 4.5 MICROgram(s) Inhaler 2 Puff(s) Inhalation two times a day  carvedilol 6.25 milliGRAM(s) Oral every 12 hours  dexAMETHasone  Injectable 6 milliGRAM(s) IV Push daily  dextrose 40% Gel 15 Gram(s) Oral once  dextrose 5%. 1000 milliLiter(s) (50 mL/Hr) IV Continuous <Continuous>  dextrose 5%. 1000 milliLiter(s) (100 mL/Hr) IV Continuous <Continuous>  dextrose 50% Injectable 25 Gram(s) IV Push once  dextrose 50% Injectable 12.5 Gram(s) IV Push once  dextrose 50% Injectable 25 Gram(s) IV Push once  enoxaparin Injectable 40 milliGRAM(s) SubCutaneous two times a day  famotidine    Tablet 20 milliGRAM(s) Oral two times a day  fluticasone propionate 50 MICROgram(s)/spray Nasal Spray 1 Spray(s) Both Nostrils two times a day  glucagon  Injectable 1 milliGRAM(s) IntraMuscular once  influenza   Vaccine 0.5 milliLiter(s) IntraMuscular once  insulin glargine Injectable (LANTUS) 10 Unit(s) SubCutaneous at bedtime  insulin lispro (ADMELOG) corrective regimen sliding scale   SubCutaneous three times a day before meals  insulin lispro Injectable (ADMELOG) 3 Unit(s) SubCutaneous three times a day before meals  loratadine 10 milliGRAM(s) Oral daily  pantoprazole    Tablet 20 milliGRAM(s) Oral before breakfast  potassium chloride    Tablet ER 40 milliEquivalent(s) Oral once  remdesivir  IVPB 100 milliGRAM(s) IV Intermittent every 24 hours  remdesivir  IVPB   IV Intermittent   sertraline 100 milliGRAM(s) Oral daily    MEDICATIONS  (PRN):  acetaminophen   Tablet .. 650 milliGRAM(s) Oral every 4 hours PRN Temp greater or equal to 38.5C (101.3F)  ALBUTerol    90 MICROgram(s) HFA Inhaler 2 Puff(s) Inhalation every 6 hours PRN Shortness of Breath and/or Wheezing        CAPILLARY BLOOD GLUCOSE      POCT Blood Glucose.: 246 mg/dL (23 Nov 2020 12:28)  POCT Blood Glucose.: 197 mg/dL (23 Nov 2020 08:24)  POCT Blood Glucose.: 202 mg/dL (22 Nov 2020 21:39)  POCT Blood Glucose.: 312 mg/dL (22 Nov 2020 16:48)    I&O's Summary    22 Nov 2020 07:01  -  23 Nov 2020 07:00  --------------------------------------------------------  IN: 840 mL / OUT: 0 mL / NET: 840 mL        PHYSICAL EXAM:  GENERAL: NAD, well-developed  HEAD:  Atraumatic, Normocephalic  EYES: conjunctiva and sclera clear  NECK: No JVD  CHEST/LUNG: Clear to auscultation bilaterally; No wheeze  HEART: Regular rate and rhythm; S1S2  ABDOMEN: Soft, Nontender, Nondistended; Bowel sounds present  EXTREMITIES:  2+ Peripheral Pulses, No clubbing, cyanosis, or edema  PSYCH: AAOx3    LABS:                        15.4   15.15 )-----------( 259      ( 23 Nov 2020 05:53 )             47.0     11-23    141  |  105  |  20  ----------------------------<  145<H>  4.1   |  26  |  0.95    Ca    9.0      23 Nov 2020 05:53    TPro  6.2  /  Alb  3.1<L>  /  TBili  0.3  /  DBili  <0.1  /  AST  42<H>  /  ALT  36  /  AlkPhos  50  11-23              RADIOLOGY & ADDITIONAL TESTS:    Imaging Personally Reviewed:    Consultant(s) Notes Reviewed:      Care Discussed with Consultants/Other Providers:  
Patient is a 60y old  Male who presents with a chief complaint of Hypoxia (24 Nov 2020 05:59)      SUBJECTIVE / OVERNIGHT EVENTS: feels better, denies cp, sob, abdominal pain     MEDICATIONS  (STANDING):  amLODIPine   Tablet 5 milliGRAM(s) Oral daily  aspirin enteric coated 81 milliGRAM(s) Oral daily  atorvastatin 80 milliGRAM(s) Oral at bedtime  benzonatate 100 milliGRAM(s) Oral three times a day  budesonide 160 MICROgram(s)/formoterol 4.5 MICROgram(s) Inhaler 2 Puff(s) Inhalation two times a day  carvedilol 6.25 milliGRAM(s) Oral every 12 hours  dexAMETHasone  Injectable 6 milliGRAM(s) IV Push daily  dextrose 40% Gel 15 Gram(s) Oral once  dextrose 5%. 1000 milliLiter(s) (50 mL/Hr) IV Continuous <Continuous>  dextrose 5%. 1000 milliLiter(s) (100 mL/Hr) IV Continuous <Continuous>  dextrose 50% Injectable 25 Gram(s) IV Push once  dextrose 50% Injectable 12.5 Gram(s) IV Push once  dextrose 50% Injectable 25 Gram(s) IV Push once  enoxaparin Injectable 40 milliGRAM(s) SubCutaneous two times a day  famotidine    Tablet 20 milliGRAM(s) Oral two times a day  fluticasone propionate 50 MICROgram(s)/spray Nasal Spray 1 Spray(s) Both Nostrils two times a day  glucagon  Injectable 1 milliGRAM(s) IntraMuscular once  influenza   Vaccine 0.5 milliLiter(s) IntraMuscular once  insulin glargine Injectable (LANTUS) 10 Unit(s) SubCutaneous at bedtime  insulin lispro (ADMELOG) corrective regimen sliding scale   SubCutaneous three times a day before meals  insulin lispro Injectable (ADMELOG) 3 Unit(s) SubCutaneous three times a day before meals  loratadine 10 milliGRAM(s) Oral daily  pantoprazole    Tablet 20 milliGRAM(s) Oral before breakfast  sertraline 100 milliGRAM(s) Oral daily    MEDICATIONS  (PRN):  acetaminophen   Tablet .. 650 milliGRAM(s) Oral every 4 hours PRN Temp greater or equal to 38.5C (101.3F)  ALBUTerol    90 MICROgram(s) HFA Inhaler 2 Puff(s) Inhalation every 6 hours PRN Shortness of Breath and/or Wheezing        CAPILLARY BLOOD GLUCOSE      POCT Blood Glucose.: 282 mg/dL (24 Nov 2020 12:24)  POCT Blood Glucose.: 185 mg/dL (24 Nov 2020 08:08)  POCT Blood Glucose.: 175 mg/dL (23 Nov 2020 21:35)  POCT Blood Glucose.: 220 mg/dL (23 Nov 2020 16:44)    I&O's Summary    23 Nov 2020 07:01  -  24 Nov 2020 07:00  --------------------------------------------------------  IN: 900 mL / OUT: 150 mL / NET: 750 mL    24 Nov 2020 07:01  -  24 Nov 2020 13:47  --------------------------------------------------------  IN: 0 mL / OUT: 400 mL / NET: -400 mL        PHYSICAL EXAM:  GENERAL: NAD, well-developed  HEAD:  Atraumatic, Normocephalic  EYES: conjunctiva and sclera clear  NECK: No JVD  EXTREMITIES:   No clubbing, cyanosis, or edema  PSYCH: AAOx3  NEUROLOGY: non-focal  SKIN: No rashes or lesions    LABS:                        15.7   14.95 )-----------( 310      ( 24 Nov 2020 06:29 )             46.3     11-24    139  |  103  |  19  ----------------------------<  147<H>  4.1   |  23  |  0.82    Ca    9.1      24 Nov 2020 06:29  Phos  3.6     11-24  Mg     2.2     11-24    TPro  6.5  /  Alb  3.5  /  TBili  0.5  /  DBili  x   /  AST  55<H>  /  ALT  54<H>  /  AlkPhos  59  11-24              RADIOLOGY & ADDITIONAL TESTS:    Imaging Personally Reviewed:    Consultant(s) Notes Reviewed:      Care Discussed with Consultants/Other Providers:  
SEEN ON BEHALF OF DR. MICAELA ZAYAS    Follow up: COVID-19 pneumonia    Interval Events: Continue to feel better today. Out of bed this morning sitting in chair. Saturating low 90s on 4L NC.    REVIEW OF SYSTEMS:  [x] All other systems negative  [ ] Unable to assess ROS because ________    OBJECTIVE:  ICU Vital Signs Last 24 Hrs  T(C): 36.4 (2020 11:55), Max: 37.2 (2020 20:36)  T(F): 97.6 (2020 11:55), Max: 99 (2020 20:36)  HR: 71 (2020 11:55) (71 - 74)  BP: 130/71 (2020 11:55) (130/71 - 153/85)  BP(mean): --  ABP: --  ABP(mean): --  RR: 18 (2020 11:55) (18 - 18)  SpO2: 91% (2020 11:55) (88% - 91%)         @ 07:01  -   @ 07:00  --------------------------------------------------------  IN: 1260 mL / OUT: 400 mL / NET: 860 mL      CAPILLARY BLOOD GLUCOSE      POCT Blood Glucose.: 319 mg/dL (2020 12:24)      PHYSICAL EXAM:  General: NAD  HEENT: NC/AT  Neck: Supple  Respiratory: CTAB, no wheezing or crackles, good air entry  Cardiovascular: RRR, no murmur, no edema  Abdomen: Soft, non-tender, non-distended  Extremities: Warm  Skin: Intact  Neurological: A&Ox3  Psychiatry: Normal mood and affect    HOSPITAL MEDICATIONS:  aspirin enteric coated 81 milliGRAM(s) Oral daily  enoxaparin Injectable 40 milliGRAM(s) SubCutaneous two times a day    remdesivir  IVPB   IV Intermittent   remdesivir  IVPB 100 milliGRAM(s) IV Intermittent every 24 hours    carvedilol 6.25 milliGRAM(s) Oral every 12 hours    atorvastatin 80 milliGRAM(s) Oral at bedtime  dexAMETHasone  Injectable 6 milliGRAM(s) IV Push daily  dextrose 40% Gel 15 Gram(s) Oral once  dextrose 50% Injectable 25 Gram(s) IV Push once  dextrose 50% Injectable 12.5 Gram(s) IV Push once  dextrose 50% Injectable 25 Gram(s) IV Push once  glucagon  Injectable 1 milliGRAM(s) IntraMuscular once  insulin glargine Injectable (LANTUS) 10 Unit(s) SubCutaneous at bedtime  insulin lispro (ADMELOG) corrective regimen sliding scale   SubCutaneous three times a day before meals    ALBUTerol    90 MICROgram(s) HFA Inhaler 2 Puff(s) Inhalation every 6 hours PRN  benzonatate 100 milliGRAM(s) Oral three times a day  loratadine 10 milliGRAM(s) Oral daily    acetaminophen   Tablet .. 650 milliGRAM(s) Oral every 4 hours PRN  sertraline 100 milliGRAM(s) Oral daily    pantoprazole    Tablet 20 milliGRAM(s) Oral before breakfast        dextrose 5%. 1000 milliLiter(s) IV Continuous <Continuous>  dextrose 5%. 1000 milliLiter(s) IV Continuous <Continuous>    influenza   Vaccine 0.5 milliLiter(s) IntraMuscular once    fluticasone propionate 50 MICROgram(s)/spray Nasal Spray 1 Spray(s) Both Nostrils two times a day        LABS:                        16.0   12.43 )-----------( 236      ( 2020 06:59 )             48.1     Hgb Trend: 16.0<--, 15.2<--, 15.9<--, 15.3<--  11-22    143  |  104  |  22  ----------------------------<  127<H>  4.5   |  28  |  0.86    Ca    9.2      2020 07:24    TPro  6.6  /  Alb  3.5  /  TBili  0.4  /  DBili  <0.1  /  AST  31  /  ALT  29  /  AlkPhos  52  11    Creatinine Trend: 0.86<--, 0.88<--, 0.95<--, 1.12<--    Urinalysis Basic - ( 2020 15:43 )    Color: Light Yellow / Appearance: Clear / S.041 / pH: x  Gluc: x / Ketone: Small  / Bili: Negative / Urobili: <2 mg/dL   Blood: x / Protein: Trace / Nitrite: Negative   Leuk Esterase: Negative / RBC: x / WBC x   Sq Epi: x / Non Sq Epi: x / Bacteria: x          
SEEN ON BEHALF OF DR. MICAELA ZAYAS    Follow up: COVID-19 pneumonia    Interval Events: Feeling better, saturating well on 3L NC, denies SOB or cough, feels lethargic    REVIEW OF SYSTEMS:  [x] All other systems negative    OBJECTIVE:  ICU Vital Signs Last 24 Hrs  T(C): 37.3 (2020 07:56), Max: 39.2 (2020 06:24)  T(F): 99.1 (2020 07:56), Max: 102.6 (2020 06:24)  HR: 96 (2020 06:24) (70 - 96)  BP: 155/84 (2020 06:24) (126/71 - 155/84)  BP(mean): --  ABP: --  ABP(mean): --  RR: 18 (2020 06:24) (18 - 19)  SpO2: 91% (2020 06:24) (89% - 92%)        11-20 @ 07:01  -   @ 07:00  --------------------------------------------------------  IN: 1750 mL / OUT: 450 mL / NET: 1300 mL      CAPILLARY BLOOD GLUCOSE      POCT Blood Glucose.: 168 mg/dL (2020 07:49)      PHYSICAL EXAM:  General: NAD  HEENT: NC/AT  Neck: Supple  Respiratory: CTAB, no wheezing or crackles, good air entry  Cardiovascular: RRR, no murmur, no edema  Abdomen: Soft, non-tender, non-distended  Extremities: Warm  Skin: Intact  Neurological: A&Ox3  Psychiatry: Normal mood and affect    HOSPITAL MEDICATIONS:  aspirin enteric coated 81 milliGRAM(s) Oral daily  enoxaparin Injectable 40 milliGRAM(s) SubCutaneous two times a day    remdesivir  IVPB   IV Intermittent   remdesivir  IVPB 100 milliGRAM(s) IV Intermittent every 24 hours    carvedilol 6.25 milliGRAM(s) Oral every 12 hours    atorvastatin 80 milliGRAM(s) Oral at bedtime  dexAMETHasone  Injectable 6 milliGRAM(s) IV Push daily  dextrose 40% Gel 15 Gram(s) Oral once  dextrose 50% Injectable 25 Gram(s) IV Push once  dextrose 50% Injectable 12.5 Gram(s) IV Push once  dextrose 50% Injectable 25 Gram(s) IV Push once  glucagon  Injectable 1 milliGRAM(s) IntraMuscular once  insulin glargine Injectable (LANTUS) 10 Unit(s) SubCutaneous at bedtime  insulin lispro (ADMELOG) corrective regimen sliding scale   SubCutaneous three times a day before meals    ALBUTerol    90 MICROgram(s) HFA Inhaler 2 Puff(s) Inhalation every 6 hours PRN  benzonatate 100 milliGRAM(s) Oral three times a day PRN  loratadine 10 milliGRAM(s) Oral daily    acetaminophen   Tablet .. 650 milliGRAM(s) Oral every 4 hours PRN  sertraline 100 milliGRAM(s) Oral daily    pantoprazole    Tablet 20 milliGRAM(s) Oral before breakfast        dextrose 5%. 1000 milliLiter(s) IV Continuous <Continuous>  dextrose 5%. 1000 milliLiter(s) IV Continuous <Continuous>    influenza   Vaccine 0.5 milliLiter(s) IntraMuscular once    fluticasone propionate 50 MICROgram(s)/spray Nasal Spray 1 Spray(s) Both Nostrils two times a day        LABS:                        15.2   13.95 )-----------( 211      ( 2020 06:47 )             43.8     Hgb Trend: 15.2<--, 15.9<--, 15.3<--  11-21    137  |  102  |  24<H>  ----------------------------<  131<H>  3.6   |  23  |  0.88    Ca    8.7      2020 06:47    TPro  6.3  /  Alb  3.5  /  TBili  0.4  /  DBili  x   /  AST  24  /  ALT  25  /  AlkPhos  48  11-21    Creatinine Trend: 0.88<--, 0.95<--, 1.12<--    Urinalysis Basic - ( 2020 15:43 )    Color: Light Yellow / Appearance: Clear / S.041 / pH: x  Gluc: x / Ketone: Small  / Bili: Negative / Urobili: <2 mg/dL   Blood: x / Protein: Trace / Nitrite: Negative   Leuk Esterase: Negative / RBC: x / WBC x   Sq Epi: x / Non Sq Epi: x / Bacteria: x      RADIOLOGY:  [x] Reviewed and interpreted by me  CXR 2020: Bilateral consolidations  
[facility] Division of Hospital Medicine  Jatinder Portillo MD  Pager (CHARLETTE-JOHNATHON, 9T-1G): [phone]  Other Times:  [phone]    Patient is a 60y old  Male who presents with a chief complaint of Hypoxia (2020 12:01)      SUBJECTIVE / OVERNIGHT EVENTS:    Overnight, no significant acute event reported.     VS: afebrile, 73, 130/71, NC 3 L    Patient was examined this morning. He reports feeling better than yesterday. Didn't feverish/chills overnight. Cough is about the same. He has been ambulating to the bathroom off oxygen shortly. Denies any diarrhea, had 1 bm yesterday.  Denies vision changes, N, abdominal pain, dysuria, pain/numbness in extremities.     ADDITIONAL REVIEW OF SYSTEMS: neg      MEDICATIONS  (STANDING):  aspirin enteric coated 81 milliGRAM(s) Oral daily  atorvastatin 80 milliGRAM(s) Oral at bedtime  benzonatate 100 milliGRAM(s) Oral three times a day  carvedilol 6.25 milliGRAM(s) Oral every 12 hours  dexAMETHasone  Injectable 6 milliGRAM(s) IV Push daily  dextrose 40% Gel 15 Gram(s) Oral once  dextrose 5%. 1000 milliLiter(s) (50 mL/Hr) IV Continuous <Continuous>  dextrose 5%. 1000 milliLiter(s) (100 mL/Hr) IV Continuous <Continuous>  dextrose 50% Injectable 25 Gram(s) IV Push once  dextrose 50% Injectable 12.5 Gram(s) IV Push once  dextrose 50% Injectable 25 Gram(s) IV Push once  enoxaparin Injectable 40 milliGRAM(s) SubCutaneous two times a day  fluticasone propionate 50 MICROgram(s)/spray Nasal Spray 1 Spray(s) Both Nostrils two times a day  glucagon  Injectable 1 milliGRAM(s) IntraMuscular once  influenza   Vaccine 0.5 milliLiter(s) IntraMuscular once  insulin glargine Injectable (LANTUS) 10 Unit(s) SubCutaneous at bedtime  insulin lispro (ADMELOG) corrective regimen sliding scale   SubCutaneous three times a day before meals  loratadine 10 milliGRAM(s) Oral daily  pantoprazole    Tablet 20 milliGRAM(s) Oral before breakfast  remdesivir  IVPB   IV Intermittent   remdesivir  IVPB 100 milliGRAM(s) IV Intermittent every 24 hours  sertraline 100 milliGRAM(s) Oral daily    MEDICATIONS  (PRN):  acetaminophen   Tablet .. 650 milliGRAM(s) Oral every 4 hours PRN Temp greater or equal to 38.5C (101.3F)  ALBUTerol    90 MICROgram(s) HFA Inhaler 2 Puff(s) Inhalation every 6 hours PRN Shortness of Breath and/or Wheezing      CAPILLARY BLOOD GLUCOSE      POCT Blood Glucose.: 181 mg/dL (2020 08:20)  POCT Blood Glucose.: 221 mg/dL (2020 21:59)  POCT Blood Glucose.: 202 mg/dL (2020 16:55)    I&O's Summary    2020 07:01  -  2020 07:00  --------------------------------------------------------  IN: 1260 mL / OUT: 400 mL / NET: 860 mL        PHYSICAL EXAM:  Vital Signs Last 24 Hrs  T(C): 36.4 (2020 11:55), Max: 37.2 (2020 20:36)  T(F): 97.6 (2020 11:55), Max: 99 (2020 20:36)  HR: 71 (2020 11:55) (69 - 74)  BP: 130/71 (2020 11:55) (124/75 - 153/85)  BP(mean): --  RR: 18 (2020 11:55) (18 - 18)  SpO2: 91% (2020 11:55) (88% - 92%)      CONSTITUTIONAL: NAD, well-developed, well-groomed  EYES: PERRL; conjunctiva and sclera clear  ENMT: Moist oral mucosa, no pharyngeal injection or exudates; normal dentition  NECK: Supple, no palpable masses;  RESPIRATORY: Normal respiratory effort; lungs are clear to auscultation bilaterally  CARDIOVASCULAR: Regular rate and rhythm, normal S1 and S2, no murmur/rub/gallop; No lower extremity edema; Peripheral pulses are 2+ bilaterally  ABDOMEN: Nontender to palpation, normoactive bowel sounds, no rebound/guarding;  MUSCULOSKELETAL:  no clubbing or cyanosis of digits; no joint swelling or tenderness to palpation  PSYCH: A+O to person, place, and time; affect appropriate  NEUROLOGY: CN 2-12 are intact and symmetric; no gross sensory deficits   SKIN: No rashes; no palpable lesions        LABS:                        16.0   12.43 )-----------( 236      ( 2020 06:59 )             48.1         143  |  104  |  22  ----------------------------<  127<H>  4.5   |  28  |  0.86    Ca    9.2      2020 07:24    TPro  6.6  /  Alb  3.5  /  TBili  0.4  /  DBili  <0.1  /  AST  31  /  ALT  29  /  AlkPhos  52            Urinalysis Basic - ( 2020 15:43 )    Color: Light Yellow / Appearance: Clear / S.041 / pH: x  Gluc: x / Ketone: Small  / Bili: Negative / Urobili: <2 mg/dL   Blood: x / Protein: Trace / Nitrite: Negative   Leuk Esterase: Negative / RBC: x / WBC x   Sq Epi: x / Non Sq Epi: x / Bacteria: x        Culture - Blood (collected 2020 12:16)  Source: .Blood Blood  Preliminary Report (2020 13:02):    No growth to date.    Culture - Blood (collected 2020 12:16)  Source: .Blood Blood  Preliminary Report (2020 13:02):    No growth to date.        RADIOLOGY & ADDITIONAL TESTS:  Results Reviewed:   Imaging Personally Reviewed:  Electrocardiogram Personally Reviewed:    COORDINATION OF CARE:  Care Discussed with Consultants/Other Providers [Y/N]:  Prior or Outpatient Records Reviewed [Y/N]:  
[facility] Division of Hospital Medicine  Jatinder Portillo MD  Pager (CHARLETTE-JOHNATHON, 8A-5P): [phone]  Other Times:  [phone]    Patient is a 60y old  Male who presents with a chief complaint of Hypoxia (2020 12:45)      SUBJECTIVE / OVERNIGHT EVENTS:    Tmax 102.6F, 96, 155/84, NC 3 L    Patient was examined this morning. He holly feverish/chills earlier in the am but felt better with ice packs and after receiving Dexamethasone. Continues to have cough and some SOB. Denies vision changes, N, abdominal pain, dysuria, pain/numbness in extremities.       ADDITIONAL REVIEW OF SYSTEMS: neg    MEDICATIONS  (STANDING):  aspirin enteric coated 81 milliGRAM(s) Oral daily  atorvastatin 80 milliGRAM(s) Oral at bedtime  benzonatate 100 milliGRAM(s) Oral three times a day  carvedilol 6.25 milliGRAM(s) Oral every 12 hours  dexAMETHasone  Injectable 6 milliGRAM(s) IV Push daily  dextrose 40% Gel 15 Gram(s) Oral once  dextrose 5%. 1000 milliLiter(s) (50 mL/Hr) IV Continuous <Continuous>  dextrose 5%. 1000 milliLiter(s) (100 mL/Hr) IV Continuous <Continuous>  dextrose 50% Injectable 25 Gram(s) IV Push once  dextrose 50% Injectable 12.5 Gram(s) IV Push once  dextrose 50% Injectable 25 Gram(s) IV Push once  enoxaparin Injectable 40 milliGRAM(s) SubCutaneous two times a day  fluticasone propionate 50 MICROgram(s)/spray Nasal Spray 1 Spray(s) Both Nostrils two times a day  glucagon  Injectable 1 milliGRAM(s) IntraMuscular once  influenza   Vaccine 0.5 milliLiter(s) IntraMuscular once  insulin glargine Injectable (LANTUS) 10 Unit(s) SubCutaneous at bedtime  insulin lispro (ADMELOG) corrective regimen sliding scale   SubCutaneous three times a day before meals  loratadine 10 milliGRAM(s) Oral daily  pantoprazole    Tablet 20 milliGRAM(s) Oral before breakfast  remdesivir  IVPB   IV Intermittent   remdesivir  IVPB 100 milliGRAM(s) IV Intermittent every 24 hours  sertraline 100 milliGRAM(s) Oral daily    MEDICATIONS  (PRN):  acetaminophen   Tablet .. 650 milliGRAM(s) Oral every 4 hours PRN Temp greater or equal to 38.5C (101.3F)  ALBUTerol    90 MICROgram(s) HFA Inhaler 2 Puff(s) Inhalation every 6 hours PRN Shortness of Breath and/or Wheezing      CAPILLARY BLOOD GLUCOSE      POCT Blood Glucose.: 212 mg/dL (2020 12:01)  POCT Blood Glucose.: 168 mg/dL (2020 07:49)  POCT Blood Glucose.: 156 mg/dL (2020 21:18)  POCT Blood Glucose.: 165 mg/dL (2020 16:45)    I&O's Summary    2020 07:01  -  2020 07:00  --------------------------------------------------------  IN: 1750 mL / OUT: 450 mL / NET: 1300 mL    2020 07:01  -  2020 14:32  --------------------------------------------------------  IN: 420 mL / OUT: 0 mL / NET: 420 mL        PHYSICAL EXAM:  Vital Signs Last 24 Hrs  T(C): 36.9 (2020 12:20), Max: 39.2 (2020 06:24)  T(F): 98.4 (2020 12:20), Max: 102.6 (2020 06:24)  HR: 69 (:20) (69 - 96)  BP: 124/75 (2020 12:20) (124/75 - 155/84)  BP(mean): --  RR: 18 (:20) (18 - 18)  SpO2: 92% (:20) (91% - 92%)      CONSTITUTIONAL: NAD, well-developed, well-groomed  EYES: PERRL; conjunctiva and sclera clear  ENMT: Moist oral mucosa, no pharyngeal injection or exudates; normal dentition  NECK: Supple, no palpable masses;  RESPIRATORY: Normal respiratory effort; lungs are clear to auscultation bilaterally  CARDIOVASCULAR: Regular rate and rhythm, normal S1 and S2, no murmur/rub/gallop; No lower extremity edema; Peripheral pulses are 2+ bilaterally  ABDOMEN: Nontender to palpation, normoactive bowel sounds, no rebound/guarding;  MUSCULOSKELETAL:  no clubbing or cyanosis of digits; no joint swelling or tenderness to palpation  PSYCH: A+O to person, place, and time; affect appropriate  NEUROLOGY: CN 2-12 are intact and symmetric; no gross sensory deficits   SKIN: No rashes; no palpable lesions      LABS:                        15.2   13.95 )-----------( 211      ( 2020 06:47 )             43.8     11    137  |  102  |  24<H>  ----------------------------<  131<H>  3.6   |  23  |  0.88    Ca    8.7      2020 06:47    TPro  6.3  /  Alb  3.5  /  TBili  0.4  /  DBili  x   /  AST  24  /  ALT  25  /  AlkPhos  48  11-21          Urinalysis Basic - ( 2020 15:43 )    Color: Light Yellow / Appearance: Clear / S.041 / pH: x  Gluc: x / Ketone: Small  / Bili: Negative / Urobili: <2 mg/dL   Blood: x / Protein: Trace / Nitrite: Negative   Leuk Esterase: Negative / RBC: x / WBC x   Sq Epi: x / Non Sq Epi: x / Bacteria: x        Culture - Blood (collected 2020 12:16)  Source: .Blood Blood  Preliminary Report (2020 13:02):    No growth to date.    Culture - Blood (collected 2020 12:16)  Source: .Blood Blood  Preliminary Report (2020 13:02):    No growth to date.        RADIOLOGY & ADDITIONAL TESTS:  Results Reviewed:   Imaging Personally Reviewed:  Electrocardiogram Personally Reviewed:    COORDINATION OF CARE:  Care Discussed with Consultants/Other Providers [Y/N]:  Prior or Outpatient Records Reviewed [Y/N]:

## 2020-11-24 NOTE — PROGRESS NOTE ADULT - PROBLEM SELECTOR PLAN 3
Takes Crestor at home ; switched to Lipitor while inpatient

## 2020-11-24 NOTE — PROGRESS NOTE ADULT - PROBLEM SELECTOR PLAN 4
Continue home medications Carvedilol 6.35mg PO BID, ASA

## 2020-11-25 LAB
CULTURE RESULTS: SIGNIFICANT CHANGE UP
CULTURE RESULTS: SIGNIFICANT CHANGE UP
SPECIMEN SOURCE: SIGNIFICANT CHANGE UP
SPECIMEN SOURCE: SIGNIFICANT CHANGE UP

## 2020-11-30 ENCOUNTER — TRANSCRIPTION ENCOUNTER (OUTPATIENT)
Age: 60
End: 2020-11-30

## 2020-12-04 ENCOUNTER — TRANSCRIPTION ENCOUNTER (OUTPATIENT)
Age: 60
End: 2020-12-04

## 2020-12-22 ENCOUNTER — RX RENEWAL (OUTPATIENT)
Age: 60
End: 2020-12-22

## 2021-01-19 ENCOUNTER — APPOINTMENT (OUTPATIENT)
Dept: PULMONOLOGY | Facility: CLINIC | Age: 61
End: 2021-01-19
Payer: COMMERCIAL

## 2021-01-19 VITALS
WEIGHT: 231 LBS | RESPIRATION RATE: 16 BRPM | BODY MASS INDEX: 29.65 KG/M2 | HEIGHT: 74 IN | DIASTOLIC BLOOD PRESSURE: 70 MMHG | OXYGEN SATURATION: 98 % | TEMPERATURE: 97.8 F | SYSTOLIC BLOOD PRESSURE: 130 MMHG | HEART RATE: 73 BPM

## 2021-01-19 PROBLEM — E78.5 HYPERLIPIDEMIA, UNSPECIFIED: Chronic | Status: ACTIVE | Noted: 2020-11-19

## 2021-01-19 PROBLEM — J40 BRONCHITIS, NOT SPECIFIED AS ACUTE OR CHRONIC: Chronic | Status: ACTIVE | Noted: 2020-11-19

## 2021-01-19 PROBLEM — I10 ESSENTIAL (PRIMARY) HYPERTENSION: Chronic | Status: ACTIVE | Noted: 2020-11-19

## 2021-01-19 PROBLEM — E11.9 TYPE 2 DIABETES MELLITUS WITHOUT COMPLICATIONS: Chronic | Status: ACTIVE | Noted: 2020-11-19

## 2021-01-19 PROCEDURE — 71046 X-RAY EXAM CHEST 2 VIEWS: CPT

## 2021-01-19 PROCEDURE — 99072 ADDL SUPL MATRL&STAF TM PHE: CPT

## 2021-01-19 PROCEDURE — 99214 OFFICE O/P EST MOD 30 MIN: CPT | Mod: 25

## 2021-01-19 NOTE — HISTORY OF PRESENT ILLNESS
[TextBox_4] : Mr. DÍAZ is a 60 year old male with a history of childhood asthma, elevated hemidiaphragm, frequent bronchitis, GERD, overweight, post nasal drip, RLS, snoring, and SOB presenting to the office today for a follow up pulmonary evaluation. His chief complaint is his wife's passing.\par -he notes he is now mourning the loss of his wife from Covid-19, and is helping his daughters and mother in law mourn\par -he is s/p Covid-19 pneumonitis at [place], received Remdisivir and dexamethasone. 3 weeks later, he checked into [place] with PNA and bacterial sepsis. He spent 4 days in the hospital with an Abx drip, and then was sent home with Abx as well - about 8 days in total\par -he reports he now feels well\par -he has no residual SOB, and is now exercising on the elliptical\par -he notes he has double vision, for which he has an appointment later today\par -he reports having a dry mouth at night\par -he reports he takes Klonopin at night to help him sleep 1 hour before bed. It is working well for him\par -he states his energy level is 7-8/10\par -he denies any coughing, chest pain, chest pressure, diarrhea, constipation, dysphagia, dizziness, sour taste in the mouth, leg swelling, leg pain, itchy eyes, itchy ears, heartburn, reflux, myalgias or arthralgias.

## 2021-01-19 NOTE — ASSESSMENT
[FreeTextEntry1] : Mr. DÍAZ is a 60 year old male with a history of childhood asthma, allergies, nonsmoker, BPH, depression, HLD, HTN, DM, who presents to the office today for pulmonary evaluation for abnormal CXR (elevated hemidiaphragm), frequent infections, and SOB - s/p Covid-19 infection pneumonitis / PNA 12/2020 - now mourning his wife's passing.\par \par The patient's shortness of breath is multifactorial due to:\par -pulmonary disease \par      -Covid-19 / Bronchospasm\par      -childhood asthma\par      -?diaphragm paralysis\par -poor breathing mechanics \par -overweight/out of shape\par -?cardiac disease \par \par \par Problem 1: Childhood asthma (controlled)\par -continue Symbicort (160) 2 inhalations BID \par \par -Asthma is believed to be caused by inherited (genetic) and environmental factor, but its exact cause is unknown. Asthma may be triggered by allergens, lung infections, or irritants in the air. Asthma triggers are different for each person \par \par Problem 1A: S/p Covid-19 Pneumonitis \par -s/p Zithromax / Plaquenil (other provider) / Remdisivir / Dexamethasone\par \par -Due to the short supply of COVID19 testing out right now- advised pt that under the advisement of the ID department at [facility]- the recommendation is that all patients with symptoms of suspected coronavirus no longer seek testing and treat symptoms at home while self-quarantined, as long as they are stable.\par Recommendations to patients with possible or confirmed COVID19:\par 1.   Stay home and away from public places. If you must go out, avoid using any kind of public transportation, ridesharing, or taxis.\par 2.   Monitor your symptoms carefully. If your symptoms get worse, call your healthcare provider immediately.\par 3.   Get rest and stay hydrated.\par 4.   Monitor temperature- treat with Tylenol 650 mg Q 6 hours up to 1000 mg TID-QID but not exceed 3500 mg daily for liver precautions. Avoid use of NSAIDs.\par 5.   Be sure to continue to take your maintenance medications for chronic conditions.\par As much as possible, stay in a specific room and away from other people in your home. Also, you should use a separate bathroom, if available. If you need to be around other people in or outside of the home, wear a facemask.\par If you develop emergency warning signs for serious complications for COVID-19 get medical attention immediately. Emergency warning signs include*:\par -Trouble breathing/worsening- unresolved SOB\par -Persistent pain or pressure in the chest\par -New confusion or inability to arouse\par -Bluish lips or face\par  -Worsening fatigue/malaise\par -Inability to drink/stay hydrated\par -High fever unresponsive to Tylenol\par Advised to keep us posted.\par Immune Support Recommendations:\par -OTC Vitamin C 500mg BID \par -OTC Quercetin 250-500mg BID \par -OTC Zinc 75-100mg per day \par -OTC Melatonin 1or 2mg a night \par -OTC Vitamin D 1-4000mg per day \par -OTC Tonic Water 8oz per day \par \par Problem 1B: PNA - ?immunodeficiency\par -resolved\par -Complete blood work: strep pneumonia titers, IgG levels, quantitative immunoglobulins \par -Due to the fact that this pt has had more infections than would be expected and immunological blood work is indicated this would include: IgG subclasses, quantitative immunoglobulins, Strep pneumoniae titers as well as Vitamin D levels. Based on this blood work we will be able to decide where the pt needs additional pneumococcal vaccine either Prevnar 13 or pneumovax. Immunology evaluation will also be potentially indicated.\par \par Problem 2: ?paralyzed left hemidiaphragm\par -Complete ultrasound of the diaphragm\par \par Problem 3: Allergy / Sinus / Post Nasal Drip (active)\par -continue Olopatadine 0.6% at 1 sniff/nostril BID \par -continue Allegra D 180 QAM\par -continue Xyzal 5 mg qHS \par \par Environmental measures for allergies were encouraged including mattress and pillow cover, air purifier, and environmental controls. \par \par Problem 4: GERD\par -Protonix 40 mg before breakfast\par \par -Rule of 2s: avoid eating too much, eating too fast, eating too late, eating too spicy, eating too lousy, eating two hours before bed.\par -Things to avoid including overeating, spicy foods, tight clothing, eating within three hours of bed, this list is not all inclusive. \par -For treatment of reflux, possible options discussed including diet control, H2 blockers, PPIs, as well as coating motility agents discussed as treatment options. Timing of meals and proximity of last meal to sleep were discussed. If symptoms persist, a formal gastrointestinal evaluation is needed.\par \par Problem 5: Frequent Infections / R/o immune deficiency\par -Complete blood work: strep pneumonia titers, IgG levels, quantitative immunoglobulins \par -Due to the fact that this pt has had more infections than would be expected and immunological blood work is indicated this would include: IgG subclasses, quantitative immunoglobulins, Strep pneumoniae titers as well as Vitamin D levels. Based on this blood work we will be able to decide where the pt needs additional pneumococcal vaccine either Prevnar 13 or pneumovax. Immunology evaluation will also be potentially indicated.\par \par Problem 6A: Poor Sleep / ?CHUCK / ?nocturnal hypoxemia\par -Studies to complete: thyroid function test, free and total testosterone level \par -Recommended to complete a home sleep study due to his inability to maintain sleep, nocturia, large neck size, elevated MP class, elevated red blood cells, nonrestorative sleep, EDS\par \par Sleep apnea is associated with adverse clinical consequences which an affect most organ systems. Cardiovascular disease risk includes arrhythmias, atrial fibrillation, hypertension, coronary artery disease, and stroke. Metabolic disorders include diabetes type 2, non-alcoholic fatty liver disease. Mood disorder especially depression; and cognitive decline especially in the elderly. Associations with chronic reflux/Spencer’s esophagus some but not all inclusive. \par -Reasons include arousal consistent with hypopnea; respiratory events most prominent in REM sleep or supine position; therefore sleep staging and body position are important for accurate diagnosis and estimation of AHI. \par \par Problem 6B: RLS\par -Add Mirapex .5 mg QHS\par -Studies to complete: iron studies\par \par Restless Legs Syndrome (RLS), also known as Mckeon-Ekbom Disease, is a common sleep -related movement disorder. About 1 in 10 adults in the [place] have problems from restless leg syndrome. It also can be seen in about 2% of children. Women are twice as likely as men to have RLS. People with RLS will have symptoms most often during times when they are less active, especially at bedtime. RLS most often causes an overwhelming urge to move your legs and sometimes other parts of your body. This urge is associated with unpleasant sensations in different parts of the body. The symptoms can be mild to severe and can affect your ability to go to sleep and stay asleep. People with RLS often sleep less at night and feel more tired during the day. \par \par Problem 7: Poor Mechanics of Breathing\par - Proper breathing techniques were reviewed with an emphasis of exhalation. Patient instructed to breath in for 1 second and out for four seconds. Patient was encouraged to not talk while walking. \par \par Problem 8: Overweight\par -Weight loss, exercise, and diet control were discussed and are highly encouraged. Treatment options were given such as, aqua therapy, and contacting a nutritionist. Recommended to use the elliptical, stationary bike, less use of treadmill. Mindful eating was explained to the patient Obesity is associated with worsening asthma, shortness of breath, and potential for cardiac disease, diabetes, and other underlying medical conditions. \par \par Problem 9: Cardiac\par -recommended to follow up with a cardiologist (Sridhar Morales)\par \par Problem 10: health maintenance\par -s/p influenza vaccine\par -recommended strep pneumonia vaccines after age 65: Prevnar-13 vaccine, followed by Pneumo vaccine 23 one year following\par -recommended early intervention for URIs\par -recommended regular osteoporosis evaluations\par -recommended early dermatological evaluations\par -recommended after the age of 50 to the age of 70, colonoscopy every 5 years \par \par  Follow up in 6-8 weeks\par -he  is recommended to call with any changes, questions, or concerns.

## 2021-01-19 NOTE — REVIEW OF SYSTEMS
[Negative] : Endocrine [Dry Mouth] : dry mouth [Cough] : no cough [Dyspnea] : no dyspnea [SOB on Exertion] : no sob on exertion [Chest Discomfort] : no chest discomfort [GERD] : no gerd [Diarrhea] : no diarrhea [Constipation] : no constipation [Dysphagia] : no dysphagia [Dizziness] : no dizziness

## 2021-01-19 NOTE — ADDENDUM
[FreeTextEntry1] : Documented by Maxim Zamora acting as a scribe for Dr. Brandan Oliveros on 01/19/2021.\par \par All medical record entries made by the Scribe were at my, Dr. Brandan Oliveros's, direction and personally dictated by me on 01/19/2021. I have reviewed the chart and agree that the record accurately reflects my personal performance of the history, physical exam, assessment and plan. I have also personally directed, reviewed, and agree with the discharge instructions.

## 2021-01-19 NOTE — REASON FOR VISIT
[Follow-Up] : a follow-up visit [Abnormal CXR/ Chest CT] : an abnormal CXR/ chest CT [TextBox_44] : MARIAM, Covid-19 infection - pneumonitis / bronchospasm / PNA 12/2020

## 2021-02-20 LAB
DEPRECATED KAPPA LC FREE/LAMBDA SER: 1.36 RATIO
IGA SER QL IEP: 252 MG/DL
IGG SER QL IEP: 1040 MG/DL
IGM SER QL IEP: 69 MG/DL
KAPPA LC CSF-MCNC: 1.33 MG/DL
KAPPA LC SERPL-MCNC: 1.81 MG/DL

## 2021-02-22 LAB
IGG SUBSET TOTAL IGG: 1032 MG/DL
IGG1 SER-MCNC: 571 MG/DL
IGG2 SER-MCNC: 304 MG/DL
IGG3 SER-MCNC: 48 MG/DL
IGG4 SER-MCNC: 27 MG/DL

## 2021-02-23 LAB
DEPRECATED S PNEUM 1 IGG SER-MCNC: 3.2 MCG/ML
DEPRECATED S PNEUM12 AB SER-ACNC: 7.5 MCG/ML
DEPRECATED S PNEUM14 AB SER-ACNC: 2.4 MCG/ML
DEPRECATED S PNEUM17 IGG SER IA-MCNC: 10.5 MCG/ML
DEPRECATED S PNEUM18 IGG SER IA-MCNC: 0.9 MCG/ML
DEPRECATED S PNEUM19 IGG SER-MCNC: 10.9 MCG/ML
DEPRECATED S PNEUM19 IGG SER-MCNC: 3.1 MCG/ML
DEPRECATED S PNEUM2 IGG SER-MCNC: 8.1 MCG/ML
DEPRECATED S PNEUM20 IGG SER-MCNC: 14.2 MCG/ML
DEPRECATED S PNEUM22 IGG SER-MCNC: 8.9 MCG/ML
DEPRECATED S PNEUM23 AB SER-ACNC: 13.5 MCG/ML
DEPRECATED S PNEUM3 AB SER-ACNC: 9.8 MCG/ML
DEPRECATED S PNEUM34 IGG SER-MCNC: 5.4 MCG/ML
DEPRECATED S PNEUM4 AB SER-ACNC: 0.9 MCG/ML
DEPRECATED S PNEUM5 IGG SER-MCNC: 13.5 MCG/ML
DEPRECATED S PNEUM6 IGG SER-MCNC: 4.3 MCG/ML
DEPRECATED S PNEUM7 IGG SER-ACNC: 8.5 MCG/ML
DEPRECATED S PNEUM8 AB SER-ACNC: 7.2 MCG/ML
DEPRECATED S PNEUM9 AB SER-ACNC: NORMAL
DEPRECATED S PNEUM9 IGG SER-MCNC: 2.3 MCG/ML
STREPTOCOCCUS PNEUMONIAE SEROTYPE 11A: 2.8 MCG/ML
STREPTOCOCCUS PNEUMONIAE SEROTYPE 15B: 3.8 MCG/ML
STREPTOCOCCUS PNEUMONIAE SEROTYPE 33F: 1.1 MCG/ML

## 2021-02-25 ENCOUNTER — NON-APPOINTMENT (OUTPATIENT)
Age: 61
End: 2021-02-25

## 2021-02-25 DIAGNOSIS — Z01.812 ENCOUNTER FOR PREPROCEDURAL LABORATORY EXAMINATION: ICD-10-CM

## 2021-04-17 LAB — SARS-COV-2 N GENE NPH QL NAA+PROBE: NOT DETECTED

## 2021-04-20 ENCOUNTER — APPOINTMENT (OUTPATIENT)
Dept: PULMONOLOGY | Facility: CLINIC | Age: 61
End: 2021-04-20
Payer: COMMERCIAL

## 2021-04-20 VITALS
WEIGHT: 235 LBS | OXYGEN SATURATION: 98 % | TEMPERATURE: 97.5 F | RESPIRATION RATE: 16 BRPM | HEIGHT: 74 IN | DIASTOLIC BLOOD PRESSURE: 70 MMHG | SYSTOLIC BLOOD PRESSURE: 123 MMHG | BODY MASS INDEX: 30.16 KG/M2 | HEART RATE: 77 BPM

## 2021-04-20 DIAGNOSIS — R06.83 SNORING: ICD-10-CM

## 2021-04-20 DIAGNOSIS — J40 BRONCHITIS, NOT SPECIFIED AS ACUTE OR CHRONIC: ICD-10-CM

## 2021-04-20 PROCEDURE — 99214 OFFICE O/P EST MOD 30 MIN: CPT | Mod: 25

## 2021-04-20 PROCEDURE — 99072 ADDL SUPL MATRL&STAF TM PHE: CPT

## 2021-04-20 PROCEDURE — 94729 DIFFUSING CAPACITY: CPT

## 2021-04-20 PROCEDURE — 94727 GAS DIL/WSHOT DETER LNG VOL: CPT

## 2021-04-20 PROCEDURE — 94010 BREATHING CAPACITY TEST: CPT

## 2021-04-20 PROCEDURE — 95012 NITRIC OXIDE EXP GAS DETER: CPT

## 2021-04-20 PROCEDURE — ZZZZZ: CPT

## 2021-04-20 NOTE — HISTORY OF PRESENT ILLNESS
[TextBox_4] : Mr. DÍAZ is a 60 year old male with a history of childhood asthma, elevated hemidiaphragm, frequent bronchitis, GERD, overweight, post nasal drip, RLS, snoring, and SOB presenting to the office today for a follow up pulmonary evaluation. His chief complaint is\par -he reports having a chronic cough, but it is improving. He believes it may be due to \par resistance bands, elliptical, and walking. He is just getting his bicycle fixed as well\par -he notes he has lost some weight\par -he reports he is not sleeping well. He takes half a Klonopin, and then wakes up 1.5 hours later. He has trouble staying asleep. He still has nightmares and wishes to get off the benzodiazepines. He is not taking Xanax anymore\par -he states he has double vision, and will be seeing an eye doctor this week (Dr. Clark)\par -he states he has returned to work to keep himself busy\par -he is not using an inhaler\par -he denies any palpitations, chest pain, chest pressure, diarrhea, constipation, dysphagia, dizziness, sour taste in the mouth, leg swelling, leg pain, itchy eyes, itchy ears, heartburn, reflux, myalgias or arthralgias.

## 2021-04-20 NOTE — ASSESSMENT
[FreeTextEntry1] : Mr. DÍAZ is a 60 year old male with a history of childhood asthma, allergies, nonsmoker, BPH, depression, HLD, HTN, DM, who presents to the office today for pulmonary evaluation for abnormal CXR (elevated hemidiaphragm), frequent infections, and SOB - s/p Covid-19 infection pneumonitis / PNA 12/2020 - now mourning his wife's passing still.  His number one issue is poor sleep.\par \par The patient's shortness of breath is multifactorial due to:\par -pulmonary disease \par      -Covid-19 / Bronchospasm\par      -childhood asthma\par      -?diaphragm paralysis\par -poor breathing mechanics \par -overweight/out of shape\par -?cardiac disease \par \par Problem 1: Childhood asthma (controlled)\par -discontinue Symbicort (160) 2 inhalations BID - transition to Trelegy 100 1 inhalation QD \par \par -Asthma is believed to be caused by inherited (genetic) and environmental factor, but its exact cause is unknown. Asthma may be triggered by allergens, lung infections, or irritants in the air. Asthma triggers are different for each person \par \par Problem 1A: S/p Covid-19 Pneumonitis (resolved)\par -s/p Zithromax / Plaquenil (other provider) / Remdisivir / Dexamethasone\par \par Immune Support Recommendations:\par -OTC Vitamin C 500mg BID \par -OTC Quercetin 250-500mg BID \par -OTC Zinc 75-100mg per day \par -OTC Melatonin 1or 2mg a night \par -OTC Vitamin D 1-4000mg per day \par -OTC Tonic Water 8oz per day \par \par Problem 1B: PNA - ?immunodeficiency\par -Complete blood work: strep pneumonia titers, IgG levels, quantitative immunoglobulins \par -Due to the fact that this pt has had more infections than would be expected and immunological blood work is indicated this would include: IgG subclasses, quantitative immunoglobulins, Strep pneumoniae titers as well as Vitamin D levels. Based on this blood work we will be able to decide where the pt needs additional pneumococcal vaccine either Prevnar 13 or pneumovax. Immunology evaluation will also be potentially indicated.\par \par Problem 2: ?paralyzed left hemidiaphragm\par -Complete ultrasound of the diaphragm\par \par Problem 3: Allergy / Sinus / Post Nasal Drip (active)\par -continue Olopatadine 0.6% at 1 sniff/nostril BID \par -continue Allegra D 180 QAM\par -continue Xyzal 5 mg qHS \par \par Environmental measures for allergies were encouraged including mattress and pillow cover, air purifier, and environmental controls. \par \par Problem 4: GERD\par -Protonix 40 mg before breakfast\par \par -Rule of 2s: avoid eating too much, eating too fast, eating too late, eating too spicy, eating too lousy, eating two hours before bed.\par -Things to avoid including overeating, spicy foods, tight clothing, eating within three hours of bed, this list is not all inclusive. \par -For treatment of reflux, possible options discussed including diet control, H2 blockers, PPIs, as well as coating motility agents discussed as treatment options. Timing of meals and proximity of last meal to sleep were discussed. If symptoms persist, a formal gastrointestinal evaluation is needed.\par \par Problem 5: Frequent Infections / R/o immune deficiency\par -Complete blood work: strep pneumonia titers, IgG levels, quantitative immunoglobulins \par -Due to the fact that this pt has had more infections than would be expected and immunological blood work is indicated this would include: IgG subclasses, quantitative immunoglobulins, Strep pneumoniae titers as well as Vitamin D levels. Based on this blood work we will be able to decide where the pt needs additional pneumococcal vaccine either Prevnar 13 or pneumovax. Immunology evaluation will also be potentially indicated.\par \par Problem 6A: Poor Sleep / ?CHUCK / ?nocturnal hypoxemia\par -add Silenor 3 mg qHS \par -Studies to complete: thyroid function test, free and total testosterone level \par -Recommended to complete an in-lab sleep study due to his inability to maintain sleep, nocturia, large neck size, elevated MP class, elevated red blood cells, nonrestorative sleep, EDS\par \par Sleep apnea is associated with adverse clinical consequences which an affect most organ systems. Cardiovascular disease risk includes arrhythmias, atrial fibrillation, hypertension, coronary artery disease, and stroke. Metabolic disorders include diabetes type 2, non-alcoholic fatty liver disease. Mood disorder especially depression; and cognitive decline especially in the elderly. Associations with chronic reflux/Spencer’s esophagus some but not all inclusive. \par -Reasons include arousal consistent with hypopnea; respiratory events most prominent in REM sleep or supine position; therefore sleep staging and body position are important for accurate diagnosis and estimation of AHI. \par \par Problem 6B: RLS\par -Add Mirapex .5 mg QHS\par \par Restless Legs Syndrome (RLS), also known as Mckeon-Ekbom Disease, is a common sleep -related movement disorder. About 1 in 10 adults in the [place] have problems from restless leg syndrome. It also can be seen in about 2% of children. Women are twice as likely as men to have RLS. People with RLS will have symptoms most often during times when they are less active, especially at bedtime. RLS most often causes an overwhelming urge to move your legs and sometimes other parts of your body. This urge is associated with unpleasant sensations in different parts of the body. The symptoms can be mild to severe and can affect your ability to go to sleep and stay asleep. People with RLS often sleep less at night and feel more tired during the day. \par \par Problem 7: Poor Mechanics of Breathing\par - Proper breathing techniques were reviewed with an emphasis of exhalation. Patient instructed to breath in for 1 second and out for four seconds. Patient was encouraged to not talk while walking. \par \par Problem 8: Overweight\par -Weight loss, exercise, and diet control were discussed and are highly encouraged. Treatment options were given such as, aqua therapy, and contacting a nutritionist. Recommended to use the elliptical, stationary bike, less use of treadmill. Mindful eating was explained to the patient Obesity is associated with worsening asthma, shortness of breath, and potential for cardiac disease, diabetes, and other underlying medical conditions. \par \par Problem 9: Cardiac\par -recommended to follow up with a cardiologist (Sridhar Morales)\par \par Problem 10: health maintenance\par -s/p influenza vaccine\par -recommended strep pneumonia vaccines after age 65: Prevnar-13 vaccine, followed by Pneumo vaccine 23 one year following\par -recommended early intervention for URIs\par -recommended regular osteoporosis evaluations\par -recommended early dermatological evaluations\par -recommended after the age of 50 to the age of 70, colonoscopy every 5 years \par \par  Follow up in 6-8 weeks\par -he  is recommended to call with any changes, questions, or concerns.

## 2021-04-20 NOTE — ADDENDUM
[FreeTextEntry1] : Documented by Maxim Zamora acting as a scribe for Dr. Brandan Oliveros on 04/20/2021.\par \par All medical record entries made by the Scribe were at my, Dr. Brandan Oliveros's, direction and personally dictated by me on 04/20/2021. I have reviewed the chart and agree that the record accurately reflects my personal performance of the history, physical exam, assessment and plan. I have also personally directed, reviewed, and agree with the discharge instructions.

## 2021-04-20 NOTE — PROCEDURE
[FreeTextEntry1] : Full PFT revealed mild-moderate obstructive dysfunction, with a FEV1 of 3.21L, which is 76% of predicted, normal lung volumes, and a normal diffusion of 27.7, which is 98% of predicted, with a normal flow volume loop \par \par FENO was 24; a normal value being less than 25\par Fractional exhaled nitric oxide (FENO) is regarded as a simple, noninvasive method for assessing eosinophilic airway inflammation. Produced by a variety of cells within the lung, nitric oxide (NO) concentrations are generally low in healthy individuals. However, high concentrations of NO appear to be involved in nonspecific host defense mechanisms and chronic inflammatory diseases such as asthma. The American Thor[employer] Society (ATS) therefore has recommended using FENO to aid in the diagnosis and monitoring of eosinophilic airway inflammation and asthma, and for identifying steroid responsive individuals whose chronic respiratory symptoms may be caused by airway inflammation.

## 2021-08-23 ENCOUNTER — APPOINTMENT (OUTPATIENT)
Dept: PULMONOLOGY | Facility: CLINIC | Age: 61
End: 2021-08-23
Payer: COMMERCIAL

## 2021-08-23 VITALS
SYSTOLIC BLOOD PRESSURE: 132 MMHG | WEIGHT: 234 LBS | BODY MASS INDEX: 30.03 KG/M2 | HEART RATE: 87 BPM | OXYGEN SATURATION: 96 % | TEMPERATURE: 97.4 F | HEIGHT: 74 IN | DIASTOLIC BLOOD PRESSURE: 70 MMHG | RESPIRATION RATE: 16 BRPM

## 2021-08-23 PROCEDURE — 99214 OFFICE O/P EST MOD 30 MIN: CPT

## 2021-08-23 RX ORDER — ROSUVASTATIN CALCIUM 20 MG/1
20 TABLET, FILM COATED ORAL
Qty: 90 | Refills: 0 | Status: ACTIVE | COMMUNITY
Start: 2021-06-21

## 2021-08-23 RX ORDER — BREXPIPRAZOLE 0.5 MG/1
0.5 TABLET ORAL
Qty: 30 | Refills: 0 | Status: ACTIVE | COMMUNITY
Start: 2021-07-28

## 2021-08-23 RX ORDER — SERTRALINE HYDROCHLORIDE 100 MG/1
100 TABLET, FILM COATED ORAL
Qty: 90 | Refills: 0 | Status: ACTIVE | COMMUNITY
Start: 2021-06-18

## 2021-08-23 RX ORDER — PANTOPRAZOLE 40 MG/1
40 TABLET, DELAYED RELEASE ORAL
Qty: 90 | Refills: 0 | Status: ACTIVE | COMMUNITY
Start: 2021-06-21

## 2021-08-23 RX ORDER — AMLODIPINE BESYLATE 5 MG/1
5 TABLET ORAL
Qty: 90 | Refills: 0 | Status: ACTIVE | COMMUNITY
Start: 2021-06-21

## 2021-08-23 RX ORDER — HYDROCHLOROTHIAZIDE 12.5 MG/1
12.5 CAPSULE ORAL
Qty: 30 | Refills: 0 | Status: ACTIVE | COMMUNITY
Start: 2020-12-09

## 2021-08-23 NOTE — HISTORY OF PRESENT ILLNESS
[TextBox_4] : Mr. DÍAZ is a 60 year old male with a history of childhood asthma, elevated hemidiaphragm, frequent bronchitis, GERD, overweight, post nasal drip, RLS, snoring, and SOB presenting to the office today for a follow up pulmonary evaluation. His chief complaint is\par -he denies exercising lately\par -he notes doing a lot of reading and music\par -he denies any SOB\par -he notes his sleep pattern is bad\par -he notes getting very little sleep\par -he notes when he gets sleep he gets horrible nightmares\par -he notes waking up last night with sweats\par -he notes he has been taking the sleeping Rx\par -he notes he is going to stop the sleeping medication\par -he notes getting 45 minutes an hour of sleep\par \par patient denies any headaches, nausea, vomiting, fever, chills, chest pain, chest pressure, palpitations, coughing, wheezing, fatigue, diarrhea, constipation, dysphagia, myalgias, dizziness, leg swelling, leg pain, itchy eyes, itchy ears, heartburn, reflux or sour taste in the mouth

## 2021-08-23 NOTE — REASON FOR VISIT
[Follow-Up] : a follow-up visit [Abnormal CXR/ Chest CT] : an abnormal CXR/ chest CT [Parent] : parent [TextBox_44] : MARIAM, Covid-19 infection - pneumonitis / bronchospasm / PNA 12/2020

## 2021-08-23 NOTE — ADDENDUM
[FreeTextEntry1] : Documented by Maxim Thurston acting as a scribe for Dr. Brandan Oliveros on (08/23/2021).\par \par All medical record entries made by the Scribe were at my, Dr. Brandan Oliveros's, direction and personally dictated by me on (08/23/2021). I have reviewed the chart and agree that the record accurately reflects my personal performance of the history, physical exam, assessment and plan. I have also personally directed, reviewed, and agree with the discharge instructions.\par

## 2021-09-02 ENCOUNTER — NON-APPOINTMENT (OUTPATIENT)
Age: 61
End: 2021-09-02

## 2021-11-18 NOTE — ED ADULT NURSE NOTE - NSFALLRSKPASTHIST_ED_ALL_ED
Met with patient for exercise FU visit; last visit was 7 weeks ago.    Patient was out of town visiting family, and then hospitalized on her visit due to her kidneys not functioning well. Patient remained in hospital for 2 weeks, and was placed on dialysis for a week. Kidneys did restore function.    Patient was also unaware that she was to meet with exercise today. EP encouraged patient to keep appt in order to gradually resume regular exercise, and avoid further deconditioning.    Patient then performed 15 minutes of NS exercise, L1, 900 steps; a decrease of 160 steps and lowered intensity.    Patient mentioned that she is currently not driving, fearing that she won't be able to lift herself out of her car. EP advised patient to resume regular daily exercise which should include Stand exercises, 1x10, and indoor walking \"as tolerated\" after every hour if inactivity.    EP instructed patient to schedule next exercise FU with her next provider visit (pharmacist in 2 weeks).  
no

## 2021-11-19 ENCOUNTER — APPOINTMENT (OUTPATIENT)
Dept: UROLOGY | Facility: CLINIC | Age: 61
End: 2021-11-19
Payer: COMMERCIAL

## 2021-11-19 PROCEDURE — 99214 OFFICE O/P EST MOD 30 MIN: CPT

## 2021-11-20 LAB
APPEARANCE: CLEAR
BACTERIA: NEGATIVE
BILIRUBIN URINE: NEGATIVE
BLOOD URINE: NEGATIVE
COLOR: NORMAL
GLUCOSE QUALITATIVE U: ABNORMAL
HYALINE CASTS: 0 /LPF
KETONES URINE: NEGATIVE
LEUKOCYTE ESTERASE URINE: NEGATIVE
MICROSCOPIC-UA: NORMAL
NITRITE URINE: NEGATIVE
PH URINE: 5.5
PROTEIN URINE: NORMAL
PSA FREE FLD-MCNC: 26 %
PSA FREE SERPL-MCNC: 0.71 NG/ML
PSA SERPL-MCNC: 2.7 NG/ML
RED BLOOD CELLS URINE: 1 /HPF
SPECIFIC GRAVITY URINE: 1.03
SQUAMOUS EPITHELIAL CELLS: 0 /HPF
UROBILINOGEN URINE: NORMAL
WHITE BLOOD CELLS URINE: 0 /HPF

## 2021-12-14 ENCOUNTER — RX RENEWAL (OUTPATIENT)
Age: 61
End: 2021-12-14

## 2021-12-23 ENCOUNTER — APPOINTMENT (OUTPATIENT)
Dept: PULMONOLOGY | Facility: CLINIC | Age: 61
End: 2021-12-23

## 2022-01-25 NOTE — ASSESSMENT
[FreeTextEntry1] : Mr. DÍAZ is a 61 year old male with a history of childhood asthma, allergies, nonsmoker, BPH, depression, HLD, HTN, DM, who presents to the office today for pulmonary evaluation for abnormal CXR (elevated hemidiaphragm), frequent infections, and SOB - s/p Covid-19 infection pneumonitis / PNA 12/2020 - now mourning his wife's passing still.  His number one issue is poor sleep (still)\par \par The patient's shortness of breath is multifactorial due to:\par -pulmonary disease \par      -Covid-19 / Bronchospasm\par      -childhood asthma\par      -?diaphragm paralysis\par -poor breathing mechanics \par -overweight/out of shape\par -?cardiac disease \par \par Problem 1: Childhood asthma (controlled)\par -discontinue Symbicort (160) 2 inhalations BID - transition to Trelegy 100 1 inhalation QD \par \par -Asthma is believed to be caused by inherited (genetic) and environmental factor, but its exact cause is unknown. Asthma may be triggered by allergens, lung infections, or irritants in the air. Asthma triggers are different for each person \par \par Problem 1A: S/p Covid-19 Pneumonitis (resolved) 12/2020\par -s/p Zithromax / Plaquenil (other provider) / Remdisivir / Dexamethasone\par \par Immune Support Recommendations:\par -OTC Vitamin C 500mg BID \par -OTC Quercetin 250-500mg BID \par -OTC Zinc 75-100mg per day \par -OTC Melatonin 1or 2mg a night \par -OTC Vitamin D 1-4000mg per day \par -OTC Tonic Water 8oz per day \par \par Problem 1B: PNA - ?immunodeficiency\par -Complete blood work: strep pneumonia titers, IgG levels, quantitative immunoglobulins \par -Due to the fact that this pt has had more infections than would be expected and immunological blood work is indicated this would include: IgG subclasses, quantitative immunoglobulins, Strep pneumoniae titers as well as Vitamin D levels. Based on this blood work we will be able to decide where the pt needs additional pneumococcal vaccine either Prevnar 13 or pneumovax. Immunology evaluation will also be potentially indicated.\par \par Problem 2: ?paralyzed left hemidiaphragm\par -Complete ultrasound of the diaphragm\par \par Problem 3: Allergy / Sinus / Post Nasal Drip (active)\par -continue Olopatadine 0.6% at 1 sniff/nostril BID \par -continue Allegra D 180 QAM\par -continue Xyzal 5 mg qHS \par \par Environmental measures for allergies were encouraged including mattress and pillow cover, air purifier, and environmental controls. \par \par Problem 4: GERD\par -Protonix 40 mg before breakfast\par \par -Rule of 2s: avoid eating too much, eating too fast, eating too late, eating too spicy, eating too lousy, eating two hours before bed.\par -Things to avoid including overeating, spicy foods, tight clothing, eating within three hours of bed, this list is not all inclusive. \par -For treatment of reflux, possible options discussed including diet control, H2 blockers, PPIs, as well as coating motility agents discussed as treatment options. Timing of meals and proximity of last meal to sleep were discussed. If symptoms persist, a formal gastrointestinal evaluation is needed.\par \par Problem 5: Frequent Infections / R/o immune deficiency\par -Complete blood work: strep pneumonia titers, IgG levels, quantitative immunoglobulins \par -Due to the fact that this pt has had more infections than would be expected and immunological blood work is indicated this would include: IgG subclasses, quantitative immunoglobulins, Strep pneumoniae titers as well as Vitamin D levels. Based on this blood work we will be able to decide where the pt needs additional pneumococcal vaccine either Prevnar 13 or pneumovax. Immunology evaluation will also be potentially indicated.\par \par Problem 6A: Poor Sleep / ?CHUCK / ?nocturnal hypoxemia (refused)\par -add Silenor 3 mg qHS \par -Studies to complete: thyroid function test, free and total testosterone level \par -Recommended to complete an in-lab sleep study due to his inability to maintain sleep, nocturia, large neck size, elevated MP class, elevated red blood cells, nonrestorative sleep, EDS\par \par Sleep apnea is associated with adverse clinical consequences which an affect most organ systems. Cardiovascular disease risk includes arrhythmias, atrial fibrillation, hypertension, coronary artery disease, and stroke. Metabolic disorders include diabetes type 2, non-alcoholic fatty liver disease. Mood disorder especially depression; and cognitive decline especially in the elderly. Associations with chronic reflux/Spencer’s esophagus some but not all inclusive. \par -Reasons include arousal consistent with hypopnea; respiratory events most prominent in REM sleep or supine position; therefore sleep staging and body position are important for accurate diagnosis and estimation of AHI. \par \par Problem 6B: RLS\par -Add Mirapex .5 mg QHS\par \par Restless Legs Syndrome (RLS), also known as Mckeon-Ekbom Disease, is a common sleep -related movement disorder. About 1 in 10 adults in the [place] have problems from restless leg syndrome. It also can be seen in about 2% of children. Women are twice as likely as men to have RLS. People with RLS will have symptoms most often during times when they are less active, especially at bedtime. RLS most often causes an overwhelming urge to move your legs and sometimes other parts of your body. This urge is associated with unpleasant sensations in different parts of the body. The symptoms can be mild to severe and can affect your ability to go to sleep and stay asleep. People with RLS often sleep less at night and feel more tired during the day. \par \par Problem 7: Poor Mechanics of Breathing\par - Proper breathing techniques were reviewed with an emphasis of exhalation. Patient instructed to breath in for 1 second and out for four seconds. Patient was encouraged to not talk while walking. \par \par Problem 8: Overweight\par -Weight loss, exercise, and diet control were discussed and are highly encouraged. Treatment options were given such as, aqua therapy, and contacting a nutritionist. Recommended to use the elliptical, stationary bike, less use of treadmill. Mindful eating was explained to the patient Obesity is associated with worsening asthma, shortness of breath, and potential for cardiac disease, diabetes, and other underlying medical conditions. \par \par Problem 9: Cardiac\par -recommended to follow up with a cardiologist (Sridhar Morales)\par \par Problem 10: health maintenance\par -s/p influenza vaccine\par -recommended strep pneumonia vaccines after age 65: Prevnar-13 vaccine, followed by Pneumo vaccine 23 one year following\par -recommended early intervention for URIs\par -recommended regular osteoporosis evaluations\par -recommended early dermatological evaluations\par -recommended after the age of 50 to the age of 70, colonoscopy every 5 years \par \par  Follow up in 6-8 weeks\par -he  is recommended to call with any changes, questions, or concerns. 
[place]las

## 2022-03-23 ENCOUNTER — RX RENEWAL (OUTPATIENT)
Age: 62
End: 2022-03-23

## 2022-03-29 ENCOUNTER — APPOINTMENT (OUTPATIENT)
Dept: PULMONOLOGY | Facility: CLINIC | Age: 62
End: 2022-03-29
Payer: COMMERCIAL

## 2022-03-29 ENCOUNTER — NON-APPOINTMENT (OUTPATIENT)
Age: 62
End: 2022-03-29

## 2022-03-29 VITALS
SYSTOLIC BLOOD PRESSURE: 130 MMHG | OXYGEN SATURATION: 96 % | HEIGHT: 74 IN | DIASTOLIC BLOOD PRESSURE: 70 MMHG | BODY MASS INDEX: 29.77 KG/M2 | HEART RATE: 74 BPM | RESPIRATION RATE: 16 BRPM | WEIGHT: 232 LBS | TEMPERATURE: 96.9 F

## 2022-03-29 DIAGNOSIS — J98.6 DISORDERS OF DIAPHRAGM: ICD-10-CM

## 2022-03-29 DIAGNOSIS — R09.82 POSTNASAL DRIP: ICD-10-CM

## 2022-03-29 DIAGNOSIS — U07.1 COVID-19: ICD-10-CM

## 2022-03-29 DIAGNOSIS — J01.90 ACUTE SINUSITIS, UNSPECIFIED: ICD-10-CM

## 2022-03-29 DIAGNOSIS — E66.3 OVERWEIGHT: ICD-10-CM

## 2022-03-29 DIAGNOSIS — G25.81 RESTLESS LEGS SYNDROME: ICD-10-CM

## 2022-03-29 DIAGNOSIS — R06.02 SHORTNESS OF BREATH: ICD-10-CM

## 2022-03-29 DIAGNOSIS — J18.9 PNEUMONIA, UNSPECIFIED ORGANISM: ICD-10-CM

## 2022-03-29 DIAGNOSIS — K21.9 GASTRO-ESOPHAGEAL REFLUX DISEASE W/OUT ESOPHAGITIS: ICD-10-CM

## 2022-03-29 DIAGNOSIS — J45.909 UNSPECIFIED ASTHMA, UNCOMPLICATED: ICD-10-CM

## 2022-03-29 PROCEDURE — 94010 BREATHING CAPACITY TEST: CPT

## 2022-03-29 PROCEDURE — 95012 NITRIC OXIDE EXP GAS DETER: CPT

## 2022-03-29 PROCEDURE — 99214 OFFICE O/P EST MOD 30 MIN: CPT | Mod: 25

## 2022-03-29 NOTE — ASSESSMENT
[FreeTextEntry1] : Mr. DÍAZ is a 61 year old male with a history of childhood asthma, allergies, nonsmoker, BPH, depression, HLD, HTN, DM, who presents to the office today for pulmonary evaluation for abnormal CXR (elevated hemidiaphragm), frequent infections, and SOB - s/p Covid-19 infection pneumonitis / PNA 12/2020 - now mourning his wife's passing still.  His number one issue is poor sleep (still); despite weight loss (10lbs)\par \par The patient's shortness of breath is multifactorial due to:\par -pulmonary disease \par      -Covid-19 / Bronchospasm\par      -childhood asthma\par      -?diaphragm paralysis\par -poor breathing mechanics \par -overweight/out of shape\par -?cardiac disease \par \par Problem 1: Childhood asthma (controlled)\par -Trelegy 100 1 inhalation QD \par \par -Asthma is believed to be caused by inherited (genetic) and environmental factor, but its exact cause is unknown. Asthma may be triggered by allergens, lung infections, or irritants in the air. Asthma triggers are different for each person \par \par Problem 1A: S/p Covid-19 Pneumonitis (resolved) 12/2020\par -s/p Zithromax / Plaquenil (other provider) / Remdisivir / Dexamethasone\par \par Immune Support Recommendations:\par -OTC Vitamin C 500mg BID \par -OTC Quercetin 250-500mg BID \par -OTC Zinc 75-100mg per day \par -OTC Melatonin 1or 2mg a night \par -OTC Vitamin D 1-4000mg per day \par -OTC Tonic Water 8oz per day \par \par Problem 1B: PNA - ?immunodeficiency\par -Complete blood work: strep pneumonia titers, IgG levels, quantitative immunoglobulins \par -Due to the fact that this pt has had more infections than would be expected and immunological blood work is indicated this would include: IgG subclasses, quantitative immunoglobulins, Strep pneumoniae titers as well as Vitamin D levels. Based on this blood work we will be able to decide where the pt needs additional pneumococcal vaccine either Prevnar 13 or pneumovax. Immunology evaluation will also be potentially indicated.\par \par Problem 2: ?paralyzed left hemidiaphragm\par -s/p ultrasound of the diaphragm- normal \par \par Problem 3: Allergy / Sinus / Post Nasal Drip (active)\par -continue Olopatadine 0.6% at 1 sniff/nostril BID \par -continue Allegra D 180 QAM\par -continue Xyzal 5 mg qHS \par \par Environmental measures for allergies were encouraged including mattress and pillow cover, air purifier, and environmental controls. \par \par Problem 3A: Acute Sinusitis\par - Augmentin 875 BID (20)\par \par Problem 4: GERD\par -Protonix 40 mg before breakfast\par \par -Rule of 2s: avoid eating too much, eating too fast, eating too late, eating too spicy, eating too lousy, eating two hours before bed.\par -Things to avoid including overeating, spicy foods, tight clothing, eating within three hours of bed, this list is not all inclusive. \par -For treatment of reflux, possible options discussed including diet control, H2 blockers, PPIs, as well as coating motility agents discussed as treatment options. Timing of meals and proximity of last meal to sleep were discussed. If symptoms persist, a formal gastrointestinal evaluation is needed.\par \par Problem 5: Frequent Infections / R/o immune deficiency\par -Complete blood work: strep pneumonia titers, IgG levels, quantitative immunoglobulins \par -Due to the fact that this pt has had more infections than would be expected and immunological blood work is indicated this would include: IgG subclasses, quantitative immunoglobulins, Strep pneumoniae titers as well as Vitamin D levels. Based on this blood work we will be able to decide where the pt needs additional pneumococcal vaccine either Prevnar 13 or pneumovax. Immunology evaluation will also be potentially indicated.\par \par Problem 6A: Poor Sleep / ?CHUCK / ?nocturnal hypoxemia (refused)\par -add Silenor 3 mg qHS \par -s/p : thyroid function test, free and total testosterone level (WNL)\par -Recommended to complete an in-lab sleep study due to his inability to maintain sleep, nocturia, large neck size, elevated MP class, elevated red blood cells, nonrestorative sleep, EDS\par \par Sleep apnea is associated with adverse clinical consequences which an affect most organ systems. Cardiovascular disease risk includes arrhythmias, atrial fibrillation, hypertension, coronary artery disease, and stroke. Metabolic disorders include diabetes type 2, non-alcoholic fatty liver disease. Mood disorder especially depression; and cognitive decline especially in the elderly. Associations with chronic reflux/Spencer’s esophagus some but not all inclusive. \par -Reasons include arousal consistent with hypopnea; respiratory events most prominent in REM sleep or supine position; therefore sleep staging and body position are important for accurate diagnosis and estimation of AHI. \par \par Problem 6B: RLS\par -Add Mirapex .5 mg QHS\par \par Restless Legs Syndrome (RLS), also known as Mckeon-Ekbom Disease, is a common sleep -related movement disorder. About 1 in 10 adults in the [place] have problems from restless leg syndrome. It also can be seen in about 2% of children. Women are twice as likely as men to have RLS. People with RLS will have symptoms most often during times when they are less active, especially at bedtime. RLS most often causes an overwhelming urge to move your legs and sometimes other parts of your body. This urge is associated with unpleasant sensations in different parts of the body. The symptoms can be mild to severe and can affect your ability to go to sleep and stay asleep. People with RLS often sleep less at night and feel more tired during the day. \par \par Problem 7: Poor Mechanics of Breathing\par -Recommended Wim Hof and Buteyko breathing techniques \par - Proper breathing techniques were reviewed with an emphasis of exhalation. Patient instructed to breath in for 1 second and out for four seconds. Patient was encouraged to not talk while walking. \par \par Problem 8: Overweight\par -Weight loss, exercise, and diet control were discussed and are highly encouraged. Treatment options were given such as, aqua therapy, and contacting a nutritionist. Recommended to use the elliptical, stationary bike, less use of treadmill. Mindful eating was explained to the patient Obesity is associated with worsening asthma, shortness of breath, and potential for cardiac disease, diabetes, and other underlying medical conditions. \par \par Problem 9: Cardiac\par -recommended to follow up with a cardiologist (Sridhar Morales)\par \par Problem 10: health maintenance\par -s/p influenza vaccine\par -recommended strep pneumonia vaccines after age 65: Prevnar-13 vaccine, followed by Pneumo vaccine 23 one year following\par -recommended early intervention for URIs\par -recommended regular osteoporosis evaluations\par -recommended early dermatological evaluations\par -recommended after the age of 50 to the age of 70, colonoscopy every 5 years \par \par  Follow up in 6-8 weeks\par -he  is recommended to call with any changes, questions, or concerns.

## 2022-03-29 NOTE — ADDENDUM
[FreeTextEntry1] : Documented by Christopher Baugh acting as a scribe for Dr. Brandan Oliveros on  (03/29/2022).\par \par All medical record entries made by the Scribe were at my, Dr. Brandan Oliveros's, direction and personally dictated by me on  (03/29/2022). I have reviewed the chart and agree that the record accurately reflects my personal performance of the history, physical exam, assessment and plan. I have also personally directed, reviewed, and agree with the discharge instructions. \par

## 2022-03-29 NOTE — HISTORY OF PRESENT ILLNESS
[TextBox_4] : Mr. DÍAZ is a 61 year old male with a history of childhood asthma, elevated hemidiaphragm, frequent bronchitis, GERD, overweight, post nasal drip, RLS, snoring, and SOB presenting to the office today for a follow up pulmonary evaluation. His chief complaint is\par - he notes feeling well in general\par - he notes exercising \par - he denies any swallowing issues\par - he notes his bowels are regular \par - he notes getting up at night to use to urinate after 2 hours and then go back to sleep \par - he notes getting 6 hours of sleep in total\par - he notes drinking enough water \par - he notes losing 10 lbs \par - he notes energy level is okay, 6-7/10\par - he notes memory suffered a little after COVID, but nothing major\par - he notes reading, and full time working \par - he notes breathing has been good \par - no new medications, vitamins, or supplements \par - he notes sinuses are active for last two days \par - he notes last night he had a sharp sinus pain that woke him up \par \par \par patient denies any headaches, nausea, vomiting, fever, chills, sweats, chest pain, chest pressure, palpitations, coughing, wheezing, fatigue, diarrhea, constipation, dysphagia, myalgias, dizziness, leg swelling, leg pain, itchy eyes, itchy ears, heartburn, reflux or sour taste in the mouth

## 2022-03-29 NOTE — PROCEDURE
[FreeTextEntry1] : Feno was 14; a normal value being less than 25. Fractional exhaled nitric oxide (FENO) is regarded as a simple, noninvasive method for assessing eosinophilic airway inflammation. Produced by a variety of cells within the lung, nitric oxide (NO) concentrations are generally low in healthy individuals. However, high concentrations of NO appear to be involved in nonspecific host defense mechanisms and chronic inflammatory  diseases such as asthma. The American Thoracic Society (ATS) therefore recommended using FENO to aid in the diagnosis and monitoring of eosinophilic airway inflammation and asthma, and for identifying steroid responsive individuals whose chronic respiratory symptoms may be caused by airway inflammation

## 2022-07-21 ENCOUNTER — APPOINTMENT (OUTPATIENT)
Dept: PULMONOLOGY | Facility: CLINIC | Age: 62
End: 2022-07-21

## 2022-07-26 ENCOUNTER — RX RENEWAL (OUTPATIENT)
Age: 62
End: 2022-07-26

## 2022-08-11 ENCOUNTER — RX RENEWAL (OUTPATIENT)
Age: 62
End: 2022-08-11

## 2022-11-22 ENCOUNTER — APPOINTMENT (OUTPATIENT)
Dept: UROLOGY | Facility: CLINIC | Age: 62
End: 2022-11-22

## 2022-11-22 VITALS
RESPIRATION RATE: 17 BRPM | BODY MASS INDEX: 29.77 KG/M2 | DIASTOLIC BLOOD PRESSURE: 74 MMHG | HEART RATE: 87 BPM | SYSTOLIC BLOOD PRESSURE: 117 MMHG | TEMPERATURE: 96.2 F | HEIGHT: 74 IN | WEIGHT: 232 LBS | OXYGEN SATURATION: 96 %

## 2022-11-22 DIAGNOSIS — R35.0 FREQUENCY OF MICTURITION: ICD-10-CM

## 2022-11-22 DIAGNOSIS — N13.8 BENIGN PROSTATIC HYPERPLASIA WITH LOWER URINARY TRACT SYMPMS: ICD-10-CM

## 2022-11-22 DIAGNOSIS — N40.1 BENIGN PROSTATIC HYPERPLASIA WITH LOWER URINARY TRACT SYMPMS: ICD-10-CM

## 2022-11-22 PROCEDURE — 99214 OFFICE O/P EST MOD 30 MIN: CPT

## 2022-11-23 LAB
APPEARANCE: CLEAR
BACTERIA: NEGATIVE
BILIRUBIN URINE: NEGATIVE
BLOOD URINE: NEGATIVE
COLOR: YELLOW
GLUCOSE QUALITATIVE U: ABNORMAL
HYALINE CASTS: 0 /LPF
KETONES URINE: NEGATIVE
LEUKOCYTE ESTERASE URINE: NEGATIVE
MICROSCOPIC-UA: NORMAL
NITRITE URINE: NEGATIVE
PH URINE: 5.5
PROTEIN URINE: NORMAL
PSA FREE FLD-MCNC: 28 %
PSA FREE SERPL-MCNC: 0.93 NG/ML
PSA SERPL-MCNC: 3.32 NG/ML
RED BLOOD CELLS URINE: 1 /HPF
SPECIFIC GRAVITY URINE: 1.04
SQUAMOUS EPITHELIAL CELLS: 0 /HPF
UROBILINOGEN URINE: NORMAL
WHITE BLOOD CELLS URINE: 0 /HPF

## 2023-03-30 NOTE — DISCHARGE NOTE PROVIDER - NSDCCAREPROVSEEN_GEN_ALL_CORE_FT
Claribel Fisher Libtayo Pregnancy And Lactation Text: This medication is contraindicated in pregnancy and when breast feeding.

## 2023-08-29 DIAGNOSIS — Z78.9 OTHER SPECIFIED HEALTH STATUS: ICD-10-CM

## 2023-08-29 RX ORDER — SITAGLIPTIN AND METFORMIN HYDROCHLORIDE 50; 1000 MG/1; MG/1
TABLET, FILM COATED ORAL
Refills: 0 | Status: DISCONTINUED | COMMUNITY
End: 2023-08-29

## 2023-08-29 RX ORDER — CLONAZEPAM 1 MG/1
1 TABLET ORAL
Qty: 60 | Refills: 0 | Status: DISCONTINUED | COMMUNITY
Start: 2021-05-18 | End: 2023-08-29

## 2023-08-29 RX ORDER — METFORMIN HYDROCHLORIDE 1000 MG/1
1000 TABLET, COATED ORAL
Qty: 180 | Refills: 0 | Status: DISCONTINUED | COMMUNITY
Start: 2021-06-22 | End: 2023-08-29

## 2023-08-29 RX ORDER — PREDNISONE 10 MG/1
10 TABLET ORAL
Qty: 50 | Refills: 0 | Status: DISCONTINUED | COMMUNITY
Start: 2020-11-17 | End: 2023-08-29

## 2023-08-29 RX ORDER — BUDESONIDE AND FORMOTEROL FUMARATE DIHYDRATE 160; 4.5 UG/1; UG/1
160-4.5 AEROSOL RESPIRATORY (INHALATION) TWICE DAILY
Qty: 1 | Refills: 3 | Status: DISCONTINUED | COMMUNITY
Start: 2020-11-17 | End: 2023-08-29

## 2023-08-29 RX ORDER — SITAGLIPTIN 100 MG/1
100 TABLET, FILM COATED ORAL
Qty: 90 | Refills: 0 | Status: DISCONTINUED | COMMUNITY
Start: 2021-06-22 | End: 2023-08-29

## 2023-08-29 RX ORDER — DIAZEPAM 5 MG/1
5 TABLET ORAL
Qty: 90 | Refills: 0 | Status: DISCONTINUED | COMMUNITY
Start: 2021-05-11 | End: 2023-08-29

## 2023-08-29 RX ORDER — SERTRALINE HYDROCHLORIDE 25 MG/1
TABLET, FILM COATED ORAL
Refills: 0 | Status: DISCONTINUED | COMMUNITY
End: 2023-08-29

## 2023-08-29 RX ORDER — CARVEDILOL 6.25 MG/1
6.25 TABLET, FILM COATED ORAL
Refills: 0 | Status: DISCONTINUED | COMMUNITY
End: 2023-08-29

## 2023-08-29 RX ORDER — DOXEPIN 3 MG/1
3 TABLET, FILM COATED ORAL
Qty: 30 | Refills: 5 | Status: DISCONTINUED | COMMUNITY
Start: 2021-04-20 | End: 2023-08-29

## 2023-08-29 RX ORDER — EMPAGLIFLOZIN 25 MG/1
25 TABLET, FILM COATED ORAL
Qty: 90 | Refills: 0 | Status: DISCONTINUED | COMMUNITY
Start: 2021-07-01 | End: 2023-08-29

## 2023-08-29 RX ORDER — OLOPATADINE HYDROCHLORIDE 665 UG/1
0.6 SPRAY, METERED NASAL
Qty: 30.5 | Refills: 5 | Status: DISCONTINUED | COMMUNITY
Start: 2020-01-31 | End: 2023-08-29

## 2023-08-29 RX ORDER — AZELASTINE HYDROCHLORIDE 137 UG/1
0.1 SPRAY, METERED NASAL
Qty: 30 | Refills: 0 | Status: DISCONTINUED | COMMUNITY
Start: 2020-12-14 | End: 2023-08-29

## 2023-08-29 RX ORDER — LOSARTAN POTASSIUM 50 MG/1
50 TABLET, FILM COATED ORAL
Qty: 90 | Refills: 0 | Status: DISCONTINUED | COMMUNITY
Start: 2021-06-21 | End: 2023-08-29

## 2023-08-29 RX ORDER — SERTRALINE HYDROCHLORIDE 50 MG/1
50 TABLET, FILM COATED ORAL
Qty: 90 | Refills: 0 | Status: DISCONTINUED | COMMUNITY
Start: 2021-06-18 | End: 2023-08-29

## 2023-08-29 RX ORDER — NEOMYCIN AND POLYMYXIN B SULFATES AND DEXAMETHASONE 3.5; 10000; 1 MG/G; [IU]/G; MG/G
3.5-10000-0.1 OINTMENT OPHTHALMIC
Qty: 4 | Refills: 0 | Status: DISCONTINUED | COMMUNITY
Start: 2021-05-29 | End: 2023-08-29

## 2023-08-29 RX ORDER — PRAMIPEXOLE DIHYDROCHLORIDE 0.5 MG/1
0.5 TABLET ORAL
Qty: 30 | Refills: 11 | Status: DISCONTINUED | COMMUNITY
Start: 2020-01-31 | End: 2023-08-29

## 2023-08-29 RX ORDER — AMOXICILLIN AND CLAVULANATE POTASSIUM 875; 125 MG/1; MG/1
875-125 TABLET, COATED ORAL
Qty: 28 | Refills: 0 | Status: DISCONTINUED | COMMUNITY
Start: 2022-03-29 | End: 2023-08-29

## 2023-08-29 RX ORDER — FLUTICASONE FUROATE, UMECLIDINIUM BROMIDE AND VILANTEROL TRIFENATATE 100; 62.5; 25 UG/1; UG/1; UG/1
100-62.5-25 POWDER RESPIRATORY (INHALATION)
Qty: 180 | Refills: 1 | Status: DISCONTINUED | COMMUNITY
Start: 2021-04-20 | End: 2023-08-29

## 2023-08-30 ENCOUNTER — APPOINTMENT (OUTPATIENT)
Dept: SURGERY | Facility: CLINIC | Age: 63
End: 2023-08-30
Payer: COMMERCIAL

## 2023-08-30 VITALS
BODY MASS INDEX: 27.21 KG/M2 | HEIGHT: 74 IN | SYSTOLIC BLOOD PRESSURE: 100 MMHG | WEIGHT: 212 LBS | RESPIRATION RATE: 18 BRPM | TEMPERATURE: 97.1 F | DIASTOLIC BLOOD PRESSURE: 63 MMHG | HEART RATE: 74 BPM | OXYGEN SATURATION: 95 %

## 2023-08-30 DIAGNOSIS — K40.90 UNILATERAL INGUINAL HERNIA, W/OUT OBSTRUCTION OR GANGRENE, NOT SPECIFIED AS RECURRENT: ICD-10-CM

## 2023-08-30 DIAGNOSIS — K40.91 UNILATERAL INGUINAL HERNIA, W/OUT OBSTRUCTION OR GANGRENE, RECURRENT: ICD-10-CM

## 2023-08-30 PROCEDURE — 99204 OFFICE O/P NEW MOD 45 MIN: CPT

## 2023-08-30 RX ORDER — TRAZODONE HYDROCHLORIDE 100 MG/1
100 TABLET ORAL
Refills: 0 | Status: ACTIVE | COMMUNITY

## 2023-08-30 RX ORDER — HYDROXYZINE PAMOATE 50 MG/1
CAPSULE ORAL
Refills: 0 | Status: ACTIVE | COMMUNITY

## 2023-09-05 PROBLEM — K40.90 HERNIA, INGUINAL, LEFT: Status: ACTIVE | Noted: 2023-09-05

## 2023-09-05 PROBLEM — K40.91 RECURRENT LEFT INGUINAL HERNIA: Status: ACTIVE | Noted: 2023-09-05

## 2023-09-05 NOTE — PHYSICAL EXAM
[Normal Breath Sounds] : Normal breath sounds [Normal Heart Sounds] : normal heart sounds [No Rash or Lesion] : No rash or lesion [Alert] : alert [Oriented to Person] : oriented to person [Oriented to Place] : oriented to place [Calm] : calm [No HSM] : no hepatosplenomegaly [JVD] : no jugular venous distention  [Abdominal Masses] : No abdominal masses [Abdomen Tenderness] : ~T ~M No abdominal tenderness [de-identified] : Well nurished well developed male [de-identified] : Normal [de-identified] : LIH reducible, NT, palpable bowel in and out of the hernia sac.  [de-identified] : Normal scrotum and testicles [de-identified] : Normal

## 2023-09-05 NOTE — HISTORY OF PRESENT ILLNESS
[de-identified] : Jeremy is a 62 y/o male with a new left groin lump.  S/p LIH repair done 20 years ago.  S/p umbilical hernia repair.  S/P remote left varicocelectomy. Patient reports pain/discomfort on left groin for 2 weeks. Noticed a bulge in the left groin with straining. BM every other day and constipated for last 2-3 weeks. Denies nausea, vomiting or fever. Patient is on baby aspirin daily. Today pt reports no pain.

## 2023-09-05 NOTE — CONSULT LETTER
[Dear  ___] : Dear ~AMIE, [Courtesy Letter:] : I had the pleasure of seeing your patient, [unfilled], in my office today. [Please see my note below.] : Please see my note below. [Sincerely,] : Sincerely, [FreeTextEntry2] : Dr Giorgio Escobar [FreeTextEntry3] : Daphne Quinn M.D., F.A.C.S., F.A.S.C.R.S. Assistant Professor of Surgery Robbie jayden Hurt[employer] [facility] [employer] at [facility]

## 2023-09-05 NOTE — ASSESSMENT
[FreeTextEntry1] : 64 yo male with a recurrent left inguinal hernia. I recommended open repair with mesh and explained the details, risks, benefits and alternatives of the procedure to the patient.  Recommended CT abd/pelvis for preoperative evaluation given the hernia is recurrent, to evaluate for old mesh, location of any mesh plugs.  The pt wishes to proceed and will schedule the surgery with my office. I'll call him with the CT results.

## 2023-09-07 ENCOUNTER — RESULT REVIEW (OUTPATIENT)
Age: 63
End: 2023-09-07

## 2023-09-14 ENCOUNTER — OUTPATIENT (OUTPATIENT)
Dept: OUTPATIENT SERVICES | Facility: HOSPITAL | Age: 63
LOS: 1 days | End: 2023-09-14
Payer: COMMERCIAL

## 2023-09-14 ENCOUNTER — APPOINTMENT (OUTPATIENT)
Dept: CT IMAGING | Facility: IMAGING CENTER | Age: 63
End: 2023-09-14
Payer: COMMERCIAL

## 2023-09-14 DIAGNOSIS — Z00.8 ENCOUNTER FOR OTHER GENERAL EXAMINATION: ICD-10-CM

## 2023-09-14 DIAGNOSIS — K46.9 UNSPECIFIED ABDOMINAL HERNIA WITHOUT OBSTRUCTION OR GANGRENE: ICD-10-CM

## 2023-09-14 PROCEDURE — 74177 CT ABD & PELVIS W/CONTRAST: CPT | Mod: 26

## 2023-09-14 PROCEDURE — 74177 CT ABD & PELVIS W/CONTRAST: CPT

## 2023-09-29 ENCOUNTER — OUTPATIENT (OUTPATIENT)
Dept: OUTPATIENT SERVICES | Facility: HOSPITAL | Age: 63
LOS: 1 days | End: 2023-09-29
Payer: COMMERCIAL

## 2023-09-29 VITALS
HEART RATE: 80 BPM | RESPIRATION RATE: 16 BRPM | TEMPERATURE: 99 F | OXYGEN SATURATION: 96 % | HEIGHT: 74 IN | SYSTOLIC BLOOD PRESSURE: 106 MMHG | DIASTOLIC BLOOD PRESSURE: 66 MMHG | WEIGHT: 218.92 LBS

## 2023-09-29 DIAGNOSIS — Z01.818 ENCOUNTER FOR OTHER PREPROCEDURAL EXAMINATION: ICD-10-CM

## 2023-09-29 DIAGNOSIS — K40.90 UNILATERAL INGUINAL HERNIA, WITHOUT OBSTRUCTION OR GANGRENE, NOT SPECIFIED AS RECURRENT: ICD-10-CM

## 2023-09-29 DIAGNOSIS — I86.1 SCROTAL VARICES: Chronic | ICD-10-CM

## 2023-09-29 DIAGNOSIS — Z98.890 OTHER SPECIFIED POSTPROCEDURAL STATES: Chronic | ICD-10-CM

## 2023-09-29 DIAGNOSIS — E11.9 TYPE 2 DIABETES MELLITUS WITHOUT COMPLICATIONS: ICD-10-CM

## 2023-09-29 PROCEDURE — 36415 COLL VENOUS BLD VENIPUNCTURE: CPT

## 2023-09-29 PROCEDURE — 80048 BASIC METABOLIC PNL TOTAL CA: CPT

## 2023-09-29 PROCEDURE — G0463: CPT

## 2023-09-29 PROCEDURE — 85027 COMPLETE CBC AUTOMATED: CPT

## 2023-09-29 PROCEDURE — 83036 HEMOGLOBIN GLYCOSYLATED A1C: CPT

## 2023-09-29 PROCEDURE — 87641 MR-STAPH DNA AMP PROBE: CPT

## 2023-09-29 PROCEDURE — 87640 STAPH A DNA AMP PROBE: CPT

## 2023-09-29 RX ORDER — CANAGLIFLOZIN 100 MG/1
1 TABLET, FILM COATED ORAL
Qty: 0 | Refills: 0 | DISCHARGE

## 2023-09-29 RX ORDER — SITAGLIPTIN AND METFORMIN HYDROCHLORIDE 500; 50 MG/1; MG/1
1 TABLET, FILM COATED ORAL
Qty: 0 | Refills: 0 | DISCHARGE

## 2023-09-29 RX ORDER — PANTOPRAZOLE SODIUM 20 MG/1
1 TABLET, DELAYED RELEASE ORAL
Qty: 0 | Refills: 0 | DISCHARGE

## 2023-09-29 RX ORDER — SERTRALINE 25 MG/1
1 TABLET, FILM COATED ORAL
Qty: 0 | Refills: 0 | DISCHARGE

## 2023-09-29 RX ORDER — ROSUVASTATIN CALCIUM 5 MG/1
1 TABLET ORAL
Qty: 0 | Refills: 0 | DISCHARGE

## 2023-09-29 RX ORDER — ASPIRIN/CALCIUM CARB/MAGNESIUM 324 MG
0 TABLET ORAL
Qty: 0 | Refills: 0 | DISCHARGE

## 2023-09-29 RX ORDER — SODIUM CHLORIDE 9 MG/ML
1000 INJECTION, SOLUTION INTRAVENOUS
Refills: 0 | Status: DISCONTINUED | OUTPATIENT
Start: 2023-10-10 | End: 2023-10-24

## 2023-09-29 NOTE — H&P PST ADULT - NSICDXPASTSURGICALHX_GEN_ALL_CORE_FT
PAST SURGICAL HISTORY:  H/O left inguinal hernia repair     H/O umbilical hernia repair     Left varicocele

## 2023-09-29 NOTE — H&P PST ADULT - HISTORY OF PRESENT ILLNESS
COVID 11/2020 hospital stay  readmitted sepsis resolved denies long term s/s  63 yr old male with hx of HTN, Diabetes Mellitus, Depression, noted left inguinal hernia work up referred for surgery.    COVID 11/2020 hospital stay  readmitted sepsis resolved denies long term s/s     *Diabetes   Ozempic last 10/4  FS on arrival

## 2023-09-29 NOTE — H&P PST ADULT - NSICDXPROCEDURE_GEN_ALL_CORE_FT
PROCEDURES:  Repair, hernia, femoral, open, using mesh, adult 29-Sep-2023 15:14:55  Carly Frederick   PROCEDURES:  Repair, hernia, inguinal, open, using mesh, adult 29-Sep-2023 15:45:47  Carly Frederick

## 2023-09-29 NOTE — H&P PST ADULT - ASSESSMENT
DASI: weight training elliptical Mets 8  Symptoms : Denies SOB, CHAUHAN, palpitations  Airway : no airway abnormalities , denies prior anesthesia complications   Mallampati : 3  Denies loose teeth    Corneal abrasion risk : Denies

## 2023-09-29 NOTE — H&P PST ADULT - NSICDXPASTMEDICALHX_GEN_ALL_CORE_FT
PAST MEDICAL HISTORY:  2019 novel coronavirus disease (COVID-19)     Bronchitis     Diabetes mellitus     History of left inguinal hernia     Hyperlipidemia     Hypertension      PAST MEDICAL HISTORY:  2019 novel coronavirus disease (COVID-19)     Anxiety and depression     Bronchitis     Diabetes mellitus     History of left inguinal hernia     History of tinnitus     Hyperlipidemia     Hypertension

## 2023-10-02 ENCOUNTER — NON-APPOINTMENT (OUTPATIENT)
Age: 63
End: 2023-10-02

## 2023-10-02 DIAGNOSIS — Z22.321 CARRIER OR SUSPECTED CARRIER OF METHICILLIN SUSCEPTIBLE STAPHYLOCOCCUS AUREUS: ICD-10-CM

## 2023-10-02 RX ORDER — MUPIROCIN 20 MG/G
2 OINTMENT TOPICAL
Qty: 1 | Refills: 0 | Status: ACTIVE | COMMUNITY
Start: 2023-10-02 | End: 1900-01-01

## 2023-10-09 ENCOUNTER — TRANSCRIPTION ENCOUNTER (OUTPATIENT)
Age: 63
End: 2023-10-09

## 2023-10-10 ENCOUNTER — RESULT REVIEW (OUTPATIENT)
Age: 63
End: 2023-10-10

## 2023-10-10 ENCOUNTER — OUTPATIENT (OUTPATIENT)
Dept: OUTPATIENT SERVICES | Facility: HOSPITAL | Age: 63
LOS: 1 days | End: 2023-10-10
Payer: COMMERCIAL

## 2023-10-10 ENCOUNTER — APPOINTMENT (OUTPATIENT)
Dept: SURGERY | Facility: HOSPITAL | Age: 63
End: 2023-10-10

## 2023-10-10 ENCOUNTER — TRANSCRIPTION ENCOUNTER (OUTPATIENT)
Age: 63
End: 2023-10-10

## 2023-10-10 VITALS
WEIGHT: 218.92 LBS | TEMPERATURE: 97 F | HEART RATE: 80 BPM | SYSTOLIC BLOOD PRESSURE: 102 MMHG | OXYGEN SATURATION: 100 % | RESPIRATION RATE: 16 BRPM | DIASTOLIC BLOOD PRESSURE: 65 MMHG | HEIGHT: 74 IN

## 2023-10-10 VITALS
OXYGEN SATURATION: 96 % | DIASTOLIC BLOOD PRESSURE: 54 MMHG | RESPIRATION RATE: 14 BRPM | TEMPERATURE: 97 F | HEART RATE: 80 BPM | SYSTOLIC BLOOD PRESSURE: 100 MMHG

## 2023-10-10 DIAGNOSIS — Z98.890 OTHER SPECIFIED POSTPROCEDURAL STATES: Chronic | ICD-10-CM

## 2023-10-10 DIAGNOSIS — K40.90 UNILATERAL INGUINAL HERNIA, WITHOUT OBSTRUCTION OR GANGRENE, NOT SPECIFIED AS RECURRENT: ICD-10-CM

## 2023-10-10 DIAGNOSIS — I86.1 SCROTAL VARICES: Chronic | ICD-10-CM

## 2023-10-10 LAB
GLUCOSE BLDC GLUCOMTR-MCNC: 110 MG/DL — HIGH (ref 70–99)
GLUCOSE BLDC GLUCOMTR-MCNC: 117 MG/DL — HIGH (ref 70–99)

## 2023-10-10 PROCEDURE — C9399: CPT

## 2023-10-10 PROCEDURE — 88304 TISSUE EXAM BY PATHOLOGIST: CPT | Mod: 26

## 2023-10-10 PROCEDURE — 82962 GLUCOSE BLOOD TEST: CPT

## 2023-10-10 PROCEDURE — 49520 REREPAIR ING HERNIA REDUCE: CPT | Mod: LT

## 2023-10-10 PROCEDURE — C1889: CPT

## 2023-10-10 PROCEDURE — 88304 TISSUE EXAM BY PATHOLOGIST: CPT

## 2023-10-10 PROCEDURE — C1781: CPT

## 2023-10-10 PROCEDURE — 49505 PRP I/HERN INIT REDUC >5 YR: CPT | Mod: LT

## 2023-10-10 DEVICE — MESH HERNIA PARIETEX PROGRIP 12 X 8CM LEFT: Type: IMPLANTABLE DEVICE | Site: LEFT | Status: FUNCTIONAL

## 2023-10-10 DEVICE — CLIP APPLIER COVIDIEN SURGICLIP 11.5" MEDIUM: Type: IMPLANTABLE DEVICE | Site: LEFT | Status: FUNCTIONAL

## 2023-10-10 RX ORDER — SEMAGLUTIDE 0.68 MG/ML
1 INJECTION, SOLUTION SUBCUTANEOUS
Refills: 0 | DISCHARGE

## 2023-10-10 RX ORDER — PANTOPRAZOLE SODIUM 20 MG/1
1 TABLET, DELAYED RELEASE ORAL
Refills: 0 | DISCHARGE

## 2023-10-10 RX ORDER — LIDOCAINE HCL 20 MG/ML
0.2 VIAL (ML) INJECTION ONCE
Refills: 0 | Status: COMPLETED | OUTPATIENT
Start: 2023-10-10 | End: 2023-10-10

## 2023-10-10 RX ORDER — ONDANSETRON 8 MG/1
4 TABLET, FILM COATED ORAL ONCE
Refills: 0 | Status: DISCONTINUED | OUTPATIENT
Start: 2023-10-10 | End: 2023-10-24

## 2023-10-10 RX ORDER — CHLORHEXIDINE GLUCONATE 213 G/1000ML
1 SOLUTION TOPICAL ONCE
Refills: 0 | Status: COMPLETED | OUTPATIENT
Start: 2023-10-10 | End: 2023-10-10

## 2023-10-10 RX ORDER — CEFAZOLIN SODIUM 1 G
2000 VIAL (EA) INJECTION ONCE
Refills: 0 | Status: COMPLETED | OUTPATIENT
Start: 2023-10-10 | End: 2023-10-10

## 2023-10-10 RX ORDER — ROSUVASTATIN CALCIUM 5 MG/1
1 TABLET ORAL
Refills: 0 | DISCHARGE

## 2023-10-10 RX ORDER — ARIPIPRAZOLE 15 MG/1
1 TABLET ORAL
Refills: 0 | DISCHARGE

## 2023-10-10 RX ORDER — ASPIRIN/CALCIUM CARB/MAGNESIUM 324 MG
1 TABLET ORAL
Refills: 0 | DISCHARGE

## 2023-10-10 RX ORDER — MULTIVIT-MIN/FERROUS GLUCONATE 9 MG/15 ML
2 LIQUID (ML) ORAL
Refills: 0 | DISCHARGE

## 2023-10-10 RX ORDER — FENTANYL CITRATE 50 UG/ML
25 INJECTION INTRAVENOUS
Refills: 0 | Status: DISCONTINUED | OUTPATIENT
Start: 2023-10-10 | End: 2023-10-10

## 2023-10-10 RX ORDER — HYDROXYZINE HCL 10 MG
1 TABLET ORAL
Refills: 0 | DISCHARGE

## 2023-10-10 RX ORDER — OLMESARTAN MEDOXOMIL 5 MG/1
1 TABLET, FILM COATED ORAL
Refills: 0 | DISCHARGE

## 2023-10-10 RX ORDER — TRAZODONE HCL 50 MG
1 TABLET ORAL
Refills: 0 | DISCHARGE

## 2023-10-10 RX ORDER — SERTRALINE 25 MG/1
1 TABLET, FILM COATED ORAL
Refills: 0 | DISCHARGE

## 2023-10-10 RX ORDER — OXYCODONE HYDROCHLORIDE 5 MG/1
1 TABLET ORAL
Qty: 8 | Refills: 0
Start: 2023-10-10

## 2023-10-10 RX ORDER — OXYCODONE HYDROCHLORIDE 5 MG/1
5 TABLET ORAL ONCE
Refills: 0 | Status: DISCONTINUED | OUTPATIENT
Start: 2023-10-10 | End: 2023-10-10

## 2023-10-10 RX ADMIN — SODIUM CHLORIDE 100 MILLILITER(S): 9 INJECTION, SOLUTION INTRAVENOUS at 12:26

## 2023-10-10 RX ADMIN — CHLORHEXIDINE GLUCONATE 1 APPLICATION(S): 213 SOLUTION TOPICAL at 12:48

## 2023-10-10 NOTE — ASU PATIENT PROFILE, ADULT - NSICDXPASTMEDICALHX_GEN_ALL_CORE_FT
PAST MEDICAL HISTORY:  2019 novel coronavirus disease (COVID-19)     Anxiety and depression     Bronchitis     Diabetes mellitus     History of left inguinal hernia     History of tinnitus     Hyperlipidemia     Hypertension

## 2023-10-10 NOTE — ASU DISCHARGE PLAN (ADULT/PEDIATRIC) - NS MD DC FALL RISK RISK
For information on Fall & Injury Prevention, visit: [email or web address] OR  [email or web address] OR  https://www.cdc.gov/steadi/patient.html

## 2023-10-10 NOTE — ASU DISCHARGE PLAN (ADULT/PEDIATRIC) - CARE PROVIDER_API CALL
Daphne Quinn  Colon/Rectal Surgery  [address] [place] [address], [address]  [place] [address]  Phone: [phone]  Fax: [phone]  Follow Up Time: 2 weeks

## 2023-10-10 NOTE — ASU DISCHARGE PLAN (ADULT/PEDIATRIC) - NURSING INSTRUCTIONS
If need patient can take Tylenol at 9:30pm, do not take more then 4,000mg of Tylenol in 24 hours. You can also take Ibuprofen at 11pm if needed.

## 2023-10-18 LAB
SURGICAL PATHOLOGY STUDY: SIGNIFICANT CHANGE UP
SURGICAL PATHOLOGY STUDY: SIGNIFICANT CHANGE UP

## 2023-10-23 ENCOUNTER — APPOINTMENT (OUTPATIENT)
Dept: SURGERY | Facility: CLINIC | Age: 63
End: 2023-10-23
Payer: COMMERCIAL

## 2023-10-23 VITALS
TEMPERATURE: 97.2 F | RESPIRATION RATE: 17 BRPM | SYSTOLIC BLOOD PRESSURE: 121 MMHG | DIASTOLIC BLOOD PRESSURE: 72 MMHG | OXYGEN SATURATION: 95 % | HEART RATE: 81 BPM

## 2023-10-23 PROBLEM — Z87.19 PERSONAL HISTORY OF OTHER DISEASES OF THE DIGESTIVE SYSTEM: Chronic | Status: ACTIVE | Noted: 2023-09-29

## 2023-10-23 PROBLEM — U07.1 COVID-19: Chronic | Status: ACTIVE | Noted: 2023-09-29

## 2023-10-23 PROBLEM — Z86.69 PERSONAL HISTORY OF OTHER DISEASES OF THE NERVOUS SYSTEM AND SENSE ORGANS: Chronic | Status: ACTIVE | Noted: 2023-09-29

## 2023-10-23 PROBLEM — F41.9 ANXIETY DISORDER, UNSPECIFIED: Chronic | Status: ACTIVE | Noted: 2023-09-29

## 2023-10-23 PROCEDURE — 99024 POSTOP FOLLOW-UP VISIT: CPT

## 2023-11-28 ENCOUNTER — APPOINTMENT (OUTPATIENT)
Dept: UROLOGY | Facility: CLINIC | Age: 63
End: 2023-11-28

## 2023-12-04 ENCOUNTER — APPOINTMENT (OUTPATIENT)
Dept: SURGERY | Facility: CLINIC | Age: 63
End: 2023-12-04
Payer: COMMERCIAL

## 2023-12-04 VITALS
OXYGEN SATURATION: 98 % | DIASTOLIC BLOOD PRESSURE: 70 MMHG | TEMPERATURE: 97.2 F | RESPIRATION RATE: 17 BRPM | HEART RATE: 86 BPM | SYSTOLIC BLOOD PRESSURE: 130 MMHG

## 2023-12-04 DIAGNOSIS — Z09 ENCOUNTER FOR FOLLOW-UP EXAMINATION AFTER COMPLETED TREATMENT FOR CONDITIONS OTHER THAN MALIGNANT NEOPLASM: ICD-10-CM

## 2023-12-04 PROCEDURE — 99024 POSTOP FOLLOW-UP VISIT: CPT

## 2024-02-01 ENCOUNTER — APPOINTMENT (OUTPATIENT)
Dept: UROLOGY | Facility: CLINIC | Age: 64
End: 2024-02-01

## 2024-06-05 NOTE — ED ADULT NURSE NOTE - NS ED NURSE REPORT GIVEN TO FT
Spoke with Nicole at [facility] and requested weekly labs.  Office contact information provided.      ANGELIQUE Ashton

## 2024-07-06 ENCOUNTER — TRANSCRIPTION ENCOUNTER (OUTPATIENT)
Age: 64
End: 2024-07-06

## 2025-01-13 NOTE — CONSULT NOTE ADULT - SUBJECTIVE AND OBJECTIVE BOX
The patient has been examined and the H&P has been reviewed:    I concur with the findings and no changes have occurred since H&P was written.    Procedure risks, benefits and alternative options discussed and understood by patient/family.          There are no hospital problems to display for this patient.     CHIEF COMPLAINT: Shortness of breath/ Low oxygen level     HPI: 60 M never smoker with a PMH of HTN, HLD, Diabetes type 2, depression, asthma, GERD, RLS, now presenting with shortness of breath and low oxygen saturation on home monitor. The patient reports that he tested positive for COVID19 on 11/10 following a meeting where he outdoor coffee with a friend, who later tested positive. Since then he has been experiencing high fever with chills and sweats, a non productive cough, nausea, body aches and intermittent non bloody diarrhea. Over the past 2 -3 days he began experiencing chest tightness and progressive shortness of breath with minimal exertion such as getting out of bed. He also reports that his O2 sat on the home pulse oximeter dipped to 85 %. He was being treated with Azithromycin and Prednisone as an outpt. Due to worsening symptoms and desaturation he was instructed to come to the Ed by Dr. Oliveros. In the ED his temp was 100.1 F and O2 sat ranged from 91 -93 % at rest and increased to 97 % on 2 L NC. Labs showed leukocytosis to 13 K, lymphopenia, CRP elevation to 3.95 and procal of 0.13. CXR showed b/l diffuse air space opacities. He received Lactated ringers,  Decadron 6 mg IV and was ordered for Remdesivir.     PAST MEDICAL & SURGICAL HISTORY:  Diabetes mellitus    Bronchitis    Hyperlipidemia    Hypertension        FAMILY HISTORY:  FH: heart disease  father        SOCIAL HISTORY:  Smoking: [x ] Never Smoked [ ] Former Smoker (__ packs x ___ years) [ ] Current Smoker  (__ packs x ___ years)  Substance Use: [x ] Never Used [ ] Used ____  EtOH Use: Social   Marital Status: [ ] Single [x ]  [ ]  [ ]   Sexual History: NC  Occupation: Works as an    Recent Travel: none  Advance Directives: Full code     Allergies    No Known Drug Allergies  [place] (Anaphylaxis)  Tree Nuts (Anaphylaxis)    Intolerances        HOME MEDICATIONS:  Reviewed     REVIEW OF SYSTEMS:  Constitutional: [ ] negative [+ ] fevers [+ ] chills [- ] weight loss [ ] weight gain  HEENT: [ ] negative [ ] dry eyes [- ] eye irritation [- ] postnasal drip [+ ] nasal congestion  CV: [ ] negative  [- ] chest pain [- ] orthopnea [ -] palpitations [ ] murmur  Resp: [ ] negative [+ ] cough [+ ] shortness of breath [ ] dyspnea [- ] wheezing [- ] sputum [ -] hemoptysis  GI: [ ] negative [- ] nausea [- ] vomiting [ -] diarrhea [- ] constipation [- ] abd pain [ ] dysphagia   : [ ] negative [- ] dysuria [- ] nocturia [- ] hematuria [ ] increased urinary frequency  Musculoskeletal: [ ] negative [ -] back pain [ ] myalgias [ ] arthralgias [ ] fracture  Skin: [ ] negative [ ] rash [- ] itch  Neurological: [ ] negative [ -] headache [ -] dizziness [- ] syncope [ ] weakness [ ] numbness  Psychiatric: [ ] negative [ ] anxiety [ -] depression  Endocrine: [ ] negative [+ ] diabetes [- ] thyroid problem  Hematologic/Lymphatic: [ ] negative [- ] anemia [- ] bleeding problem  Allergic/Immunologic: [ ] negative [ ] itchy eyes [- ] nasal discharge [ ] hives [ ] angioedema  [x ] All other systems negative  [ ] Unable to assess ROS because ________      OBJECTIVE:  ICU Vital Signs Last 24 Hrs  T(C): 37.8 (19 Nov 2020 14:07), Max: 37.8 (19 Nov 2020 14:07)  T(F): 100.1 (19 Nov 2020 14:07), Max: 100.1 (19 Nov 2020 14:07)  HR: 82 (19 Nov 2020 16:00) (82 - 92)  BP: 133/79 (19 Nov 2020 16:00) (122/79 - 133/79)  BP(mean): --  ABP: --  ABP(mean): --  RR: 20 (19 Nov 2020 16:00) (19 - 20)  SpO2: 97% (19 Nov 2020 16:00) (93% - 97%)        CAPILLARY BLOOD GLUCOSE          PHYSICAL EXAM:  General: NAD, speaking in full sentences, no accessory muscle use   HEENT: PERRLA  Lymph Nodes: No palpable cervical LNs  Neck: Supple  Respiratory: slighlty coarse BS b/l bases , no wheezing or crackles   Cardiovascular: RRR, S1+S2+0  Abdomen: Soft, NT, ND, BS+  Extremities: No edema, pulses + b/l LEs  Skin: No rash   Neurological: AAOx3, grossly non focal   Psychiatry: Normal mood     HOSPITAL MEDICATIONS:  aspirin enteric coated 81 milliGRAM(s) Oral daily  enoxaparin Injectable 40 milliGRAM(s) SubCutaneous two times a day    remdesivir  IVPB   IV Intermittent       atorvastatin 80 milliGRAM(s) Oral at bedtime  dexAMETHasone  Injectable 6 milliGRAM(s) IV Push daily  dextrose 40% Gel 15 Gram(s) Oral once  dextrose 50% Injectable 25 Gram(s) IV Push once  dextrose 50% Injectable 12.5 Gram(s) IV Push once  dextrose 50% Injectable 25 Gram(s) IV Push once  glucagon  Injectable 1 milliGRAM(s) IntraMuscular once  insulin glargine Injectable (LANTUS) 10 Unit(s) SubCutaneous at bedtime  insulin lispro (ADMELOG) corrective regimen sliding scale   SubCutaneous three times a day before meals    ALBUTerol    90 MICROgram(s) HFA Inhaler 2 Puff(s) Inhalation every 6 hours PRN  benzonatate 100 milliGRAM(s) Oral three times a day PRN    acetaminophen   Tablet .. 650 milliGRAM(s) Oral every 4 hours PRN  sertraline 100 milliGRAM(s) Oral daily    pantoprazole    Tablet 20 milliGRAM(s) Oral before breakfast        dextrose 5%. 1000 milliLiter(s) IV Continuous <Continuous>  dextrose 5%. 1000 milliLiter(s) IV Continuous <Continuous>            LABS:                        15.3   13.19 )-----------( 166      ( 19 Nov 2020 15:40 )             46.0     Hgb Trend: 15.3<--  11-19    139  |  99  |  21  ----------------------------<  142<H>  4.0   |  23  |  1.12    Ca    9.0      19 Nov 2020 15:40    TPro  6.6  /  Alb  3.7  /  TBili  0.4  /  DBili  x   /  AST  18  /  ALT  32  /  AlkPhos  51  11-19    Creatinine Trend: 1.12<--            MICROBIOLOGY:   COVID PCR: pending     RADIOLOGY:    < from: Xray Chest 1 View- PORTABLE-Urgent (11.19.20 @ 17:13) >  The heart is normal in size. Diffuse airspace opacity is seen throughout both lungs more pronounced on the right compatible with pneumonia. No pleural effusion. No pneumothorax.    [ x] Reviewed and interpreted by me    PULMONARY FUNCTION TESTS:    EKG:

## 2025-05-23 NOTE — DISCHARGE NOTE PROVIDER - NS AS DC PROVIDER CONTACT Y/N MULTI
Can we find out what supplies she needs to get so this does not leak. All of the supplies she has at home fit her old tube. Thanks 
Yes

## (undated) DEVICE — GLV 6.5 PROTEXIS (WHITE)

## (undated) DEVICE — DRSG STERISTRIPS 0.5 X 4"

## (undated) DEVICE — SUT MONOCRYL 4-0 27" PS-2 UNDYED

## (undated) DEVICE — SUT POLYSORB 0 36" GU-46

## (undated) DEVICE — GLV 7 PROTEXIS (BLUE)

## (undated) DEVICE — SUT POLYSORB 2-0 18" TIES UNDYED

## (undated) DEVICE — MEDICATION LABELS W MARKER

## (undated) DEVICE — DRSG TEGADERM 4X4.75"

## (undated) DEVICE — WARMING BLANKET UPPER ADULT

## (undated) DEVICE — DRAPE TOWEL BLUE 17" X 24"

## (undated) DEVICE — SOL IRR POUR NS 0.9% 500ML

## (undated) DEVICE — PREP CHLORAPREP HI-LITE ORANGE 26ML

## (undated) DEVICE — SOL IRR POUR H2O 250ML

## (undated) DEVICE — SPECIMEN CONTAINER 100ML

## (undated) DEVICE — DRAPE INSTRUMENT POUCH 6.75" X 11"

## (undated) DEVICE — SUT POLYSORB 3-0 30" V-20 UNDYED

## (undated) DEVICE — DRSG MASTISOL

## (undated) DEVICE — DRSG TEGADERM + PAD 3.5X4"

## (undated) DEVICE — SYR LUER LOK 10CC

## (undated) DEVICE — VENODYNE/SCD SLEEVE CALF MEDIUM

## (undated) DEVICE — PACK ADULT HERNIA

## (undated) DEVICE — BLADE SCALPEL SAFETYLOCK #15

## (undated) DEVICE — LIGASURE SMALL JAW

## (undated) DEVICE — DRAIN PENROSE .25" X 18" LATEX